# Patient Record
Sex: FEMALE | Race: WHITE
[De-identification: names, ages, dates, MRNs, and addresses within clinical notes are randomized per-mention and may not be internally consistent; named-entity substitution may affect disease eponyms.]

---

## 2017-09-12 ENCOUNTER — HOSPITAL ENCOUNTER (OUTPATIENT)
Dept: HOSPITAL 53 - M LAB | Age: 62
End: 2017-09-12
Attending: FAMILY MEDICINE
Payer: COMMERCIAL

## 2017-09-12 DIAGNOSIS — I10: Primary | ICD-10-CM

## 2017-09-12 LAB
ALBUMIN SERPL BCG-MCNC: 3.8 GM/DL (ref 3.2–5.2)
ALBUMIN/GLOB SERPL: 1.15 {RATIO} (ref 1–1.93)
ALP SERPL-CCNC: 74 U/L (ref 45–117)
ALT SERPL W P-5'-P-CCNC: 43 U/L (ref 12–78)
ANION GAP SERPL CALC-SCNC: 9 MEQ/L (ref 8–16)
AST SERPL-CCNC: 53 U/L (ref 15–37)
BASOPHILS # BLD AUTO: 0 K/MM3 (ref 0–0.2)
BASOPHILS NFR BLD AUTO: 0.3 % (ref 0–1)
BILIRUB SERPL-MCNC: 0.7 MG/DL (ref 0.2–1)
BUN SERPL-MCNC: 15 MG/DL (ref 7–18)
CALCIUM SERPL-MCNC: 9.4 MG/DL (ref 8.8–10.2)
CHLORIDE SERPL-SCNC: 94 MEQ/L (ref 98–107)
CHOLEST SERPL-MCNC: 188 MG/DL (ref ?–200)
CO2 SERPL-SCNC: 29 MEQ/L (ref 21–32)
CREAT SERPL-MCNC: 0.74 MG/DL (ref 0.55–1.02)
EOSINOPHIL # BLD AUTO: 0.2 K/MM3 (ref 0–0.5)
EOSINOPHIL NFR BLD AUTO: 1.8 % (ref 0–3)
ERYTHROCYTE [DISTWIDTH] IN BLOOD BY AUTOMATED COUNT: 12.8 % (ref 11.5–14.5)
GFR SERPL CREATININE-BSD FRML MDRD: > 60 ML/MIN/{1.73_M2} (ref 45–?)
GLUCOSE SERPL-MCNC: 97 MG/DL (ref 80–110)
LARGE UNSTAINED CELL #: 0.1 K/MM3 (ref 0–0.4)
LARGE UNSTAINED CELL %: 1 % (ref 0–4)
LYMPHOCYTES # BLD AUTO: 2.9 K/MM3 (ref 1.5–4.5)
LYMPHOCYTES NFR BLD AUTO: 27.7 % (ref 24–44)
MCH RBC QN AUTO: 31.3 PG (ref 27–33)
MCHC RBC AUTO-ENTMCNC: 34.1 G/DL (ref 32–36.5)
MCV RBC AUTO: 91.8 FL (ref 80–96)
MONOCYTES # BLD AUTO: 0.4 K/MM3 (ref 0–0.8)
MONOCYTES NFR BLD AUTO: 4.4 % (ref 0–5)
NEUTROPHILS # BLD AUTO: 6.5 K/MM3 (ref 1.8–7.7)
NEUTROPHILS NFR BLD AUTO: 64.8 % (ref 36–66)
PLATELET # BLD AUTO: 359 K/MM3 (ref 150–450)
POTASSIUM SERPL-SCNC: 3.8 MEQ/L (ref 3.5–5.1)
PROT SERPL-MCNC: 7.1 GM/DL (ref 6.4–8.2)
SODIUM SERPL-SCNC: 132 MEQ/L (ref 136–145)
TRIGL SERPL-MCNC: 126 MG/DL (ref ?–150)
WBC # BLD AUTO: 10.1 K/MM3 (ref 4–10)

## 2017-12-14 ENCOUNTER — HOSPITAL ENCOUNTER (OUTPATIENT)
Dept: HOSPITAL 53 - M RAD | Age: 62
End: 2017-12-14
Attending: FAMILY MEDICINE
Payer: COMMERCIAL

## 2017-12-14 DIAGNOSIS — M25.532: Primary | ICD-10-CM

## 2017-12-14 NOTE — REP
LEFT WRIST:

 

Four views:

 

There is no evidence of an acute fracture, dislocation or intrinsic bone disease.

I do not see significant arthritic changes.

 

IMPRESSION:

 

No fracture or dislocation. Further evaluation may be made with MRI if clinically

indicated.

 

 

Signed by

Taz Reynolds MD 12/15/2017 04:55 P

## 2018-05-07 ENCOUNTER — HOSPITAL ENCOUNTER (OUTPATIENT)
Dept: HOSPITAL 53 - M WHC | Age: 63
End: 2018-05-07
Attending: FAMILY MEDICINE
Payer: COMMERCIAL

## 2018-05-07 DIAGNOSIS — Z12.31: Primary | ICD-10-CM

## 2018-05-07 PROCEDURE — 77067 SCR MAMMO BI INCL CAD: CPT

## 2018-06-21 ENCOUNTER — HOSPITAL ENCOUNTER (OUTPATIENT)
Dept: HOSPITAL 53 - M LAB | Age: 63
End: 2018-06-21
Attending: FAMILY MEDICINE
Payer: COMMERCIAL

## 2018-06-21 DIAGNOSIS — I10: Primary | ICD-10-CM

## 2018-06-21 LAB
ALBUMIN/GLOBULIN RATIO: 1.22 (ref 1–1.93)
ALBUMIN: 3.9 GM/DL (ref 3.2–5.2)
ALKALINE PHOSPHATASE: 82 U/L (ref 45–117)
ALT SERPL W P-5'-P-CCNC: 28 U/L (ref 12–78)
ANION GAP: 8 MEQ/L (ref 8–16)
AST SERPL-CCNC: 21 U/L (ref 7–37)
BASO #: 0 10^3/UL (ref 0–0.2)
BASO %: 0.3 % (ref 0–1)
BILIRUBIN,TOTAL: 0.5 MG/DL (ref 0.2–1)
BLOOD UREA NITROGEN: 25 MG/DL (ref 7–18)
CALCIUM LEVEL: 9.3 MG/DL (ref 8.8–10.2)
CARBON DIOXIDE LEVEL: 31 MEQ/L (ref 21–32)
CHLORIDE LEVEL: 97 MEQ/L (ref 98–107)
CHOLEST SERPL-MCNC: 183 MG/DL (ref ?–200)
CHOLESTEROL RISK RATIO: 3.45 (ref ?–5)
CREATININE FOR GFR: 0.94 MG/DL (ref 0.55–1.3)
EOS #: 0.1 10^3/UL (ref 0–0.5)
EOSINOPHIL NFR BLD AUTO: 0.4 % (ref 0–3)
GFR SERPL CREATININE-BSD FRML MDRD: > 60 ML/MIN/{1.73_M2} (ref 45–?)
GLUCOSE, FASTING: 93 MG/DL (ref 70–100)
HDLC SERPL-MCNC: 53 MG/DL (ref 40–?)
HEMATOCRIT: 35.4 % (ref 36–47)
HEMOGLOBIN: 12.7 G/DL (ref 12–15.5)
IMMATURE GRANULOCYTE %: 0.2 % (ref 0–3)
LDL CHOLESTEROL: 94 MG/DL (ref ?–100)
LYMPH #: 2.5 10^3/UL (ref 1.5–4.5)
LYMPH %: 17.8 % (ref 24–44)
MEAN CORPUSCULAR HEMOGLOBIN: 31.8 PG (ref 27–33)
MEAN CORPUSCULAR HGB CONC: 35.9 G/DL (ref 32–36.5)
MEAN CORPUSCULAR VOLUME: 88.5 FL (ref 80–96)
MONO #: 0.9 10^3/UL (ref 0–0.8)
MONO %: 6.2 % (ref 0–5)
NEUTROPHILS #: 10.5 10^3/UL (ref 1.8–7.7)
NEUTROPHILS %: 75.1 % (ref 36–66)
NONHDLC SERPL-MCNC: 130 MG/DL
NRBC BLD AUTO-RTO: 0 % (ref 0–0)
PLATELET COUNT, AUTOMATED: 357 10^3/UL (ref 150–450)
POTASSIUM SERUM: 3.6 MEQ/L (ref 3.5–5.1)
RED BLOOD COUNT: 4 10^6/UL (ref 4–5.4)
RED CELL DISTRIBUTION WIDTH: 11.9 % (ref 11.5–14.5)
SODIUM LEVEL: 136 MEQ/L (ref 136–145)
TOTAL PROTEIN: 7.1 GM/DL (ref 6.4–8.2)
TRIGLYCERIDES LEVEL: 180 MG/DL (ref ?–150)
WHITE BLOOD COUNT: 14 10^3/UL (ref 4–10)

## 2018-06-21 PROCEDURE — 80053 COMPREHEN METABOLIC PANEL: CPT

## 2018-06-27 ENCOUNTER — HOSPITAL ENCOUNTER (OUTPATIENT)
Dept: HOSPITAL 53 - M LAB REF | Age: 63
End: 2018-06-27
Attending: INTERNAL MEDICINE
Payer: COMMERCIAL

## 2018-06-27 DIAGNOSIS — R19.7: Primary | ICD-10-CM

## 2018-06-27 PROCEDURE — 82705 FATS/LIPIDS FECES QUAL: CPT

## 2018-06-28 ENCOUNTER — HOSPITAL ENCOUNTER (OUTPATIENT)
Dept: HOSPITAL 53 - M RAD | Age: 63
End: 2018-06-28
Attending: INTERNAL MEDICINE
Payer: COMMERCIAL

## 2018-06-28 DIAGNOSIS — K52.89: Primary | ICD-10-CM

## 2018-06-28 DIAGNOSIS — I70.0: ICD-10-CM

## 2018-07-06 ENCOUNTER — HOSPITAL ENCOUNTER (OUTPATIENT)
Dept: HOSPITAL 53 - M LAB | Age: 63
End: 2018-07-06
Attending: INTERNAL MEDICINE
Payer: COMMERCIAL

## 2018-07-06 DIAGNOSIS — R19.7: Primary | ICD-10-CM

## 2018-07-06 LAB — IMMUNOGLOBULIN A: 277 MG/DL (ref 70–400)

## 2018-07-06 PROCEDURE — 82941 ASSAY OF GASTRIN: CPT

## 2018-07-07 LAB — ELASTASE PANC STL-MCNT: 366 UG/G (ref 200–?)

## 2018-07-10 LAB
CGA SERPL-SCNC: 2 NMOL/L (ref 0–5)
GASTRIN SERPL-MCNC: 63 PG/ML (ref 0–115)

## 2018-07-30 ENCOUNTER — HOSPITAL ENCOUNTER (OUTPATIENT)
Dept: HOSPITAL 53 - M OPP | Age: 63
Discharge: HOME | End: 2018-07-30
Attending: INTERNAL MEDICINE
Payer: COMMERCIAL

## 2018-07-30 DIAGNOSIS — Z79.899: ICD-10-CM

## 2018-07-30 DIAGNOSIS — M54.2: ICD-10-CM

## 2018-07-30 DIAGNOSIS — Q43.8: ICD-10-CM

## 2018-07-30 DIAGNOSIS — K52.9: Primary | ICD-10-CM

## 2018-07-30 DIAGNOSIS — F32.9: ICD-10-CM

## 2018-07-30 DIAGNOSIS — F41.9: ICD-10-CM

## 2018-07-30 DIAGNOSIS — G43.909: ICD-10-CM

## 2018-07-30 DIAGNOSIS — K21.9: ICD-10-CM

## 2018-07-30 DIAGNOSIS — M54.89: ICD-10-CM

## 2018-07-30 DIAGNOSIS — I10: ICD-10-CM

## 2018-07-30 DIAGNOSIS — M19.90: ICD-10-CM

## 2018-07-30 DIAGNOSIS — D12.2: ICD-10-CM

## 2018-07-30 DIAGNOSIS — F17.210: ICD-10-CM

## 2018-07-30 DIAGNOSIS — E78.5: ICD-10-CM

## 2018-07-30 PROCEDURE — 45385 COLONOSCOPY W/LESION REMOVAL: CPT

## 2018-07-30 RX ADMIN — SODIUM CHLORIDE 1 MLS/HR: 9 INJECTION, SOLUTION INTRAVENOUS at 11:45

## 2018-08-03 ENCOUNTER — HOSPITAL ENCOUNTER (OUTPATIENT)
Dept: HOSPITAL 53 - M LAB | Age: 63
End: 2018-08-03
Attending: FAMILY MEDICINE
Payer: COMMERCIAL

## 2018-08-03 DIAGNOSIS — D72.829: Primary | ICD-10-CM

## 2018-08-03 LAB
BASO #: 0 10^3/UL (ref 0–0.2)
BASO %: 0.4 % (ref 0–1)
EOS #: 0.1 10^3/UL (ref 0–0.5)
EOSINOPHIL NFR BLD AUTO: 0.9 % (ref 0–3)
HEMATOCRIT: 35.5 % (ref 36–47)
HEMOGLOBIN: 12.8 G/DL (ref 12–15.5)
IMMATURE GRANULOCYTE %: 0.2 % (ref 0–3)
LYMPH #: 2.7 10^3/UL (ref 1.5–4.5)
LYMPH %: 31.9 % (ref 24–44)
MEAN CORPUSCULAR HEMOGLOBIN: 32.6 PG (ref 27–33)
MEAN CORPUSCULAR HGB CONC: 36.1 G/DL (ref 32–36.5)
MEAN CORPUSCULAR VOLUME: 90.3 FL (ref 80–96)
MONO #: 0.6 10^3/UL (ref 0–0.8)
MONO %: 6.9 % (ref 0–5)
NEUTROPHILS #: 5 10^3/UL (ref 1.8–7.7)
NEUTROPHILS %: 59.7 % (ref 36–66)
NRBC BLD AUTO-RTO: 0 % (ref 0–0)
PLATELET COUNT, AUTOMATED: 351 10^3/UL (ref 150–450)
RED BLOOD COUNT: 3.93 10^6/UL (ref 4–5.4)
RED CELL DISTRIBUTION WIDTH: 12 % (ref 11.5–14.5)
WHITE BLOOD COUNT: 8.4 10^3/UL (ref 4–10)

## 2018-08-03 PROCEDURE — 85025 COMPLETE CBC W/AUTO DIFF WBC: CPT

## 2019-01-04 ENCOUNTER — HOSPITAL ENCOUNTER (OUTPATIENT)
Dept: HOSPITAL 53 - M LAB | Age: 64
End: 2019-01-04
Attending: PHYSICIAN ASSISTANT
Payer: COMMERCIAL

## 2019-01-04 DIAGNOSIS — Z00.00: Primary | ICD-10-CM

## 2019-01-04 DIAGNOSIS — I10: ICD-10-CM

## 2019-01-04 LAB
ALBUMIN SERPL BCG-MCNC: 4 GM/DL (ref 3.2–5.2)
ALT SERPL W P-5'-P-CCNC: 35 U/L (ref 12–78)
BASOPHILS # BLD AUTO: 0.1 10^3/UL (ref 0–0.2)
BASOPHILS NFR BLD AUTO: 0.4 % (ref 0–1)
BILIRUB SERPL-MCNC: 0.5 MG/DL (ref 0.2–1)
BUN SERPL-MCNC: 21 MG/DL (ref 7–18)
CALCIUM SERPL-MCNC: 9 MG/DL (ref 8.8–10.2)
CHLORIDE SERPL-SCNC: 100 MEQ/L (ref 98–107)
CHOLEST SERPL-MCNC: 232 MG/DL (ref ?–200)
CHOLEST/HDLC SERPL: 5.95 {RATIO} (ref ?–5)
CO2 SERPL-SCNC: 28 MEQ/L (ref 21–32)
CREAT SERPL-MCNC: 0.99 MG/DL (ref 0.55–1.3)
EOSINOPHIL # BLD AUTO: 0.1 10^3/UL (ref 0–0.5)
EOSINOPHIL NFR BLD AUTO: 0.9 % (ref 0–3)
GFR SERPL CREATININE-BSD FRML MDRD: > 60 ML/MIN/{1.73_M2} (ref 45–?)
GLUCOSE SERPL-MCNC: 104 MG/DL (ref 70–100)
HCT VFR BLD AUTO: 35.3 % (ref 36–47)
HDLC SERPL-MCNC: 39 MG/DL (ref 40–?)
HGB BLD-MCNC: 12.5 G/DL (ref 12–15.5)
LDLC SERPL CALC-MCNC: 130 MG/DL (ref ?–100)
LYMPHOCYTES # BLD AUTO: 4 10^3/UL (ref 1.5–4.5)
LYMPHOCYTES NFR BLD AUTO: 29.1 % (ref 24–44)
MCH RBC QN AUTO: 32.3 PG (ref 27–33)
MCHC RBC AUTO-ENTMCNC: 35.4 G/DL (ref 32–36.5)
MCV RBC AUTO: 91.2 FL (ref 80–96)
MONOCYTES # BLD AUTO: 0.8 10^3/UL (ref 0–0.8)
MONOCYTES NFR BLD AUTO: 5.5 % (ref 0–5)
NEUTROPHILS # BLD AUTO: 8.9 10^3/UL (ref 1.8–7.7)
NEUTROPHILS NFR BLD AUTO: 63.8 % (ref 36–66)
NONHDLC SERPL-MCNC: 193 MG/DL
PLATELET # BLD AUTO: 430 10^3/UL (ref 150–450)
POTASSIUM SERPL-SCNC: 3.8 MEQ/L (ref 3.5–5.1)
PROT SERPL-MCNC: 7.3 GM/DL (ref 6.4–8.2)
RBC # BLD AUTO: 3.87 10^6/UL (ref 4–5.4)
SODIUM SERPL-SCNC: 135 MEQ/L (ref 136–145)
TRIGL SERPL-MCNC: 317 MG/DL (ref ?–150)
WBC # BLD AUTO: 13.9 10^3/UL (ref 4–10)

## 2019-01-08 ENCOUNTER — HOSPITAL ENCOUNTER (OUTPATIENT)
Dept: HOSPITAL 53 - M LAB | Age: 64
End: 2019-01-08
Attending: FAMILY MEDICINE
Payer: COMMERCIAL

## 2019-01-08 DIAGNOSIS — R30.0: Primary | ICD-10-CM

## 2019-01-09 ENCOUNTER — HOSPITAL ENCOUNTER (OUTPATIENT)
Dept: HOSPITAL 53 - M LAB REF | Age: 64
End: 2019-01-09
Attending: FAMILY MEDICINE
Payer: COMMERCIAL

## 2019-01-09 DIAGNOSIS — R30.0: Primary | ICD-10-CM

## 2019-01-09 LAB
APPEARANCE UR: (no result)
BACTERIA UR QL AUTO: NEGATIVE
BILIRUB UR QL STRIP.AUTO: NEGATIVE
GLUCOSE UR QL STRIP.AUTO: NEGATIVE MG/DL
HGB UR QL STRIP.AUTO: NEGATIVE
KETONES UR QL STRIP.AUTO: NEGATIVE MG/DL
LEUKOCYTE ESTERASE UR QL STRIP.AUTO: NEGATIVE
MUCOUS THREADS URNS QL MICRO: (no result)
NITRITE UR QL STRIP.AUTO: NEGATIVE
PH UR STRIP.AUTO: 5 UNITS (ref 5–9)
PROT UR QL STRIP.AUTO: NEGATIVE MG/DL
RBC # UR AUTO: 4 /HPF (ref 0–3)
SP GR UR STRIP.AUTO: 1.01 (ref 1–1.03)
SQUAMOUS #/AREA URNS AUTO: 0 /HPF (ref 0–6)
UROBILINOGEN UR QL STRIP.AUTO: 0.2 MG/DL (ref 0–2)
WBC #/AREA URNS AUTO: 2 /HPF (ref 0–3)

## 2019-01-28 ENCOUNTER — HOSPITAL ENCOUNTER (OUTPATIENT)
Dept: HOSPITAL 53 - M LAB REF | Age: 64
End: 2019-01-28
Attending: INTERNAL MEDICINE
Payer: COMMERCIAL

## 2019-01-28 DIAGNOSIS — R19.7: Primary | ICD-10-CM

## 2019-04-30 ENCOUNTER — HOSPITAL ENCOUNTER (OUTPATIENT)
Dept: HOSPITAL 53 - M LAB | Age: 64
End: 2019-04-30
Attending: PHYSICIAN ASSISTANT
Payer: COMMERCIAL

## 2019-04-30 DIAGNOSIS — R73.01: Primary | ICD-10-CM

## 2019-04-30 LAB
BASOPHILS # BLD AUTO: 0 10^3/UL (ref 0–0.2)
BASOPHILS NFR BLD AUTO: 0.4 % (ref 0–1)
BUN SERPL-MCNC: 20 MG/DL (ref 7–18)
CALCIUM SERPL-MCNC: 8.6 MG/DL (ref 8.8–10.2)
CHLORIDE SERPL-SCNC: 99 MEQ/L (ref 98–107)
CO2 SERPL-SCNC: 30 MEQ/L (ref 21–32)
CREAT SERPL-MCNC: 1.13 MG/DL (ref 0.55–1.3)
EOSINOPHIL # BLD AUTO: 0.1 10^3/UL (ref 0–0.5)
EOSINOPHIL NFR BLD AUTO: 0.9 % (ref 0–3)
EST. AVERAGE GLUCOSE BLD GHB EST-MCNC: 120 MG/DL (ref 60–110)
GFR SERPL CREATININE-BSD FRML MDRD: 51.9 ML/MIN/{1.73_M2} (ref 45–?)
GLUCOSE SERPL-MCNC: 83 MG/DL (ref 70–100)
HCT VFR BLD AUTO: 33.8 % (ref 36–47)
HGB BLD-MCNC: 11.7 G/DL (ref 12–15.5)
LYMPHOCYTES # BLD AUTO: 3.8 10^3/UL (ref 1.5–4.5)
LYMPHOCYTES NFR BLD AUTO: 41.4 % (ref 24–44)
MCH RBC QN AUTO: 31.7 PG (ref 27–33)
MCHC RBC AUTO-ENTMCNC: 34.6 G/DL (ref 32–36.5)
MCV RBC AUTO: 91.6 FL (ref 80–96)
MONOCYTES # BLD AUTO: 0.6 10^3/UL (ref 0–0.8)
MONOCYTES NFR BLD AUTO: 6.5 % (ref 0–5)
NEUTROPHILS # BLD AUTO: 4.6 10^3/UL (ref 1.8–7.7)
NEUTROPHILS NFR BLD AUTO: 50.6 % (ref 36–66)
PLATELET # BLD AUTO: 384 10^3/UL (ref 150–450)
POTASSIUM SERPL-SCNC: 4.2 MEQ/L (ref 3.5–5.1)
RBC # BLD AUTO: 3.69 10^6/UL (ref 4–5.4)
SODIUM SERPL-SCNC: 134 MEQ/L (ref 136–145)
WBC # BLD AUTO: 9.1 10^3/UL (ref 4–10)

## 2019-07-25 ENCOUNTER — HOSPITAL ENCOUNTER (OUTPATIENT)
Dept: HOSPITAL 53 - M WHC | Age: 64
End: 2019-07-25
Attending: FAMILY MEDICINE
Payer: COMMERCIAL

## 2019-07-25 DIAGNOSIS — Z12.31: Primary | ICD-10-CM

## 2019-07-25 DIAGNOSIS — Z78.0: ICD-10-CM

## 2019-07-25 NOTE — REPMRS
Patient History

The patient states she had a clinical breast exam in 09/2019.

Patient is postmenopausal and is nulliparous.

No known family history of cancer.

No Hormone Replacement Therapy

 

3D TOMOSYNTHESIS WAS PERFORMED.

 

[facility] for [facility] cancer [facility] 6.9%.

 

Digital Woman Screen Mammo: July 25, 2019 - Exam #: 

[id number]

Bilateral CC and MLO view(s) were taken.

 

Technologist: Dorothy Landa, Technologist

Prior study comparison: May 7, 2018, digital woman screen mammo 

performed at [facility].  2016, bilateral 

digital mammo screening bilat, performed at [facility].

 

FINDINGS: The breast tissue is heterogeneously dense.  This may 

lower the sensitivity of mammography.  There has been no change 

in the appearance of the mammogram from the prior studies.  There

is a moderate amount of residual fibroglandular tissue which is 

fairly symmetric. There is no interval development of dominant 

mass, areas of architectural distortion, or clustered 

microcalcification typical of malignancy.

 

Assessment: BI-RADS/ACR category 1 mammogram. Negative Mammogram.

 

Recommendation

Routine screening mammogram in 1 year (for women over age 40).

This mammogram was interpreted with the aid of an FDA-approved 

computer-aided dectection system.

 

Electronically Signed By: Taz Reynolds MD 07/25/19 [id number]

## 2019-10-07 ENCOUNTER — HOSPITAL ENCOUNTER (OUTPATIENT)
Dept: HOSPITAL 53 - M LAB | Age: 64
End: 2019-10-07
Attending: FAMILY MEDICINE
Payer: COMMERCIAL

## 2019-10-07 DIAGNOSIS — M35.00: Primary | ICD-10-CM

## 2020-06-11 ENCOUNTER — HOSPITAL ENCOUNTER (INPATIENT)
Dept: HOSPITAL 53 - M ED | Age: 65
LOS: 5 days | Discharge: HOME | DRG: 720 | End: 2020-06-16
Attending: INTERNAL MEDICINE | Admitting: INTERNAL MEDICINE
Payer: COMMERCIAL

## 2020-06-11 VITALS — DIASTOLIC BLOOD PRESSURE: 57 MMHG | SYSTOLIC BLOOD PRESSURE: 100 MMHG

## 2020-06-11 VITALS — WEIGHT: 137.35 LBS | BODY MASS INDEX: 26.97 KG/M2 | HEIGHT: 60 IN

## 2020-06-11 DIAGNOSIS — N17.9: ICD-10-CM

## 2020-06-11 DIAGNOSIS — N18.9: ICD-10-CM

## 2020-06-11 DIAGNOSIS — D64.9: ICD-10-CM

## 2020-06-11 DIAGNOSIS — Z79.899: ICD-10-CM

## 2020-06-11 DIAGNOSIS — E83.42: ICD-10-CM

## 2020-06-11 DIAGNOSIS — I12.9: ICD-10-CM

## 2020-06-11 DIAGNOSIS — E87.6: ICD-10-CM

## 2020-06-11 DIAGNOSIS — N39.0: ICD-10-CM

## 2020-06-11 DIAGNOSIS — Z88.8: ICD-10-CM

## 2020-06-11 DIAGNOSIS — Z91.14: ICD-10-CM

## 2020-06-11 DIAGNOSIS — A41.9: Primary | ICD-10-CM

## 2020-06-11 DIAGNOSIS — K51.90: ICD-10-CM

## 2020-06-11 LAB
ALBUMIN SERPL BCG-MCNC: 3.2 GM/DL (ref 3.2–5.2)
ALT SERPL W P-5'-P-CCNC: 26 U/L (ref 12–78)
BASOPHILS # BLD AUTO: 0 10^3/UL (ref 0–0.2)
BASOPHILS NFR BLD AUTO: 0.2 % (ref 0–1)
BILIRUB CONJ SERPL-MCNC: < 0.1 MG/DL (ref 0–0.2)
BILIRUB SERPL-MCNC: 0.4 MG/DL (ref 0.2–1)
BUN SERPL-MCNC: 25 MG/DL (ref 7–18)
CALCIUM SERPL-MCNC: 8 MG/DL (ref 8.8–10.2)
CHLORIDE SERPL-SCNC: 111 MEQ/L (ref 98–107)
CK MB CFR.DF SERPL CALC: 1.82
CK MB SERPL-MCNC: < 1 NG/ML (ref ?–3.6)
CK SERPL-CCNC: 55 U/L (ref 26–192)
CO2 SERPL-SCNC: 18 MEQ/L (ref 21–32)
CREAT SERPL-MCNC: 1.47 MG/DL (ref 0.55–1.3)
CRP SERPL-MCNC: 1.18 MG/DL (ref 0–0.3)
EOSINOPHIL # BLD AUTO: 0 10^3/UL (ref 0–0.5)
EOSINOPHIL NFR BLD AUTO: 0.1 % (ref 0–3)
ERYTHROCYTE [SEDIMENTATION RATE] IN BLOOD BY WESTERGREN METHOD: 39 MM/HR (ref 0–30)
GFR SERPL CREATININE-BSD FRML MDRD: 38.2 ML/MIN/{1.73_M2} (ref 45–?)
GLUCOSE SERPL-MCNC: 91 MG/DL (ref 70–100)
HCT VFR BLD AUTO: 32.4 % (ref 36–47)
HGB BLD-MCNC: 11.3 G/DL (ref 12–15.5)
LIPASE SERPL-CCNC: 179 U/L (ref 73–393)
LYMPHOCYTES # BLD AUTO: 1.3 10^3/UL (ref 1.5–5)
LYMPHOCYTES NFR BLD AUTO: 5.5 % (ref 24–44)
MAGNESIUM SERPL-MCNC: 0.6 MG/DL (ref 1.8–2.4)
MCH RBC QN AUTO: 30.4 PG (ref 27–33)
MCHC RBC AUTO-ENTMCNC: 34.9 G/DL (ref 32–36.5)
MCV RBC AUTO: 87.1 FL (ref 80–96)
MONOCYTES # BLD AUTO: 1.3 10^3/UL (ref 0–0.8)
MONOCYTES NFR BLD AUTO: 5.6 % (ref 0–5)
NEUTROPHILS # BLD AUTO: 20.6 10^3/UL (ref 1.5–8.5)
NEUTROPHILS NFR BLD AUTO: 87.8 % (ref 36–66)
NT-PRO BNP: 174 PG/ML (ref ?–125)
PLATELET # BLD AUTO: 308 10^3/UL (ref 150–450)
POTASSIUM SERPL-SCNC: 3.4 MEQ/L (ref 3.5–5.1)
PROT SERPL-MCNC: 6.3 GM/DL (ref 6.4–8.2)
RBC # BLD AUTO: 3.72 10^6/UL (ref 4–5.4)
SODIUM SERPL-SCNC: 140 MEQ/L (ref 136–145)
SODIUM SERPL-SCNC: 143 MEQ/L (ref 136–145)
TROPONIN I SERPL-MCNC: < 0.02 NG/ML (ref ?–0.1)
TSH SERPL DL<=0.005 MIU/L-ACNC: 2.2 UIU/ML (ref 0.36–3.74)
WBC # BLD AUTO: 23.5 10^3/UL (ref 4–10)

## 2020-06-11 RX ADMIN — GABAPENTIN SCH MG: 300 CAPSULE ORAL at 20:48

## 2020-06-11 RX ADMIN — POTASSIUM CHLORIDE AND SODIUM CHLORIDE SCH MLS/HR: 900; 150 INJECTION, SOLUTION INTRAVENOUS at 19:02

## 2020-06-11 RX ADMIN — SODIUM CHLORIDE SCH UNITS: 4.5 INJECTION, SOLUTION INTRAVENOUS at 20:49

## 2020-06-11 RX ADMIN — METHYLPREDNISOLONE SODIUM SUCCINATE SCH MG: 125 INJECTION, POWDER, FOR SOLUTION INTRAMUSCULAR; INTRAVENOUS at 20:48

## 2020-06-11 NOTE — ECGEPIP
[facility] - ED

                                       

                                       Test Date:    2020

Pat Name:     KARTHIK STEWART        Department:   

Patient ID:   [id number]                 Room:         -

Gender:       Female                   Technician:   carlin

:          1955               Requested By: Francy Sanchez 

Order Number: [id number]     Reading MD:   Dhiraj Tinoco

                                 Measurements

Intervals                              Axis          

Rate:         90                       P:            43

NJ:           167                      QRS:          -7

QRSD:         92                       T:            29

QT:           358                                    

QTc:          439                                    

                           Interpretive Statements

SINUS RHYTHM

MINIMAL ST DEPRESSION

NO PRIORS FOR COMPARISON

Electronically Signed on 2020 21:34:32 EDT by Dhiraj Tinoco

## 2020-06-11 NOTE — REP
REASON FOR THIS EXAM:  Chest pain.

 

Latest prior for comparison is 12/29/2010.

 

FINDINGS:  The technique utilized in obtaining the radiograph has magnified the

cardiac silhouette and accentuated the interstitial markings.

 

The superior mediastinal structures are midline.  The cardiac silhouette is

unremarkable in size, shape, and position. The diaphragmatic surfaces of the

lungs are regular, and the costophrenic angles are clear.  The pulmonary fields

are clear.  The imaged osseous structures are intact.

 

IMPRESSION:

 

There is no acute cardiopulmonary disease.

 

 

Electronically Signed by

Simeon Vázquez DO 06/11/2020 05:07 P

## 2020-06-11 NOTE — HPEPDOC
[facility] Medical History & Physical


Date of Admission


Jun 11, 2020


Date of Service:  Jun 11, 2020


Primary Care Physician:  Mary Wallace MD


Attending Physician:  TREVON FRAZIER MD





History and Physical


TIME OF SERVICE: 6:50 PM


   


CHIEF COMPLAINT: Difficulties walking





HISTORY OF PRESENT ILLNESS: 


This is a 64-year-old female who presented with multiple complaints including 

difficulties walking, feeling "numb all over", cold, having spasm in her legs 

and difficulties breathing. The symptoms began around lunchtime while she was at

work. With more pointed questioning she admitted to having a fever, abdominal 

pain, increased thirst, pain with urination, and dark-colored urine. She has had

diarrhea with 5-6 episodes per day for one month because he couldn't afford her 

budesonide. Based on CT scan findings of colon edema Jai Brennan consulted 

 and ordered Zosyn for possible colitis..





REVIEW OF SYSTEMS: 12 point review of systems negative except as listed above





PAST MEDICAL/ SURGICAL HISTORY:


Ulcerative colitis with chronic joint pain


Chronic hypertension


Dyslipidemia


Chronic neck and back pain


Partial Hysterectomy


She denies having a history of MI, CVA or diabetes





SOCIAL HISTORY:


She smokes.


She doesn't drink





FAMILY HISTORY:


She denies having a family history of any medical problems


   


ALLERGIES: Please see below.





HOME MEDICATIONS: Please see below. 





PHYSICAL EXAMINATION:


Vital Signs








  Date Time  Temp Pulse Resp B/P (MAP) Pulse Ox O2 Delivery O2 Flow Rate FiO2


 


6/11/20 13:31    90/52 (65)    


 


6/11/20 13:34 100.6 92 24  100 Room Air  





GEN: slim build/ well developed/ NAD


INTEGUMENT: not flushed/ not jaundice 


HEENT: NCAT 


CVS: RRR/NMRG/ no lower extremity edema


LUNGS:  able to speak full sentences without stopping to take a breath / lungs a

re clear to auscultation bilaterally on room air


ABDOMEN:  Contour (distended) /the abdomen is soft & tender with palpation of 

the epigastric region 


MSK/EXTREMITIES: range of motion intact in all 4 extremities 


NEURO: CN 2-12 are grossly intact / speech is not dysarthric


PSYCH: alert and oriented to person place and time/ able to understand and 

follow all commands





LABORATORY DATA: 





Immature Granulocyte % (Auto) 0.8, Neutrophils (%) (Auto) 87.8H, Lymphocytes (%)

(Auto) 5.5L, Monocytes (%) (Auto) 5.6H, Eosinophils (%) (Auto) 0.1, Basophils 

(%) (Auto) 0.2, Neutrophils # (Auto) 20.6H, Lymphocytes # (Auto) 1.3L, Monocytes

# (Auto) 1.3H, Eosinophils # (Auto) 0.0, Basophils # (Auto) 0.0, Nucleated Red 

Blood Cells % (auto) 0.0, Erythrocyte Sedimentation Rate 39H, Anion Gap 11, 

Glomerular Filtration Rate 38.2L, Calcium Level 8.0L, Total Bilirubin 0.4, 

Direct Bilirubin < 0.1, Aspartate Amino Transf (AST/SGOT) 17, Alanine 

Aminotransferase (ALT/SGPT) 26, Alkaline Phosphatase 95, Total Creatine Kinase 

55, Creatine Kinase MB < 1.0, Creatine Kinase MB Relative Index 1.82, Troponin I

< 0.02, C-Reactive Protein, Quantitative 1.18H, NT-Pro-B-Type Natriuretic 

Peptide 174H, Total Protein 6.3L, Albumin 3.2, Albumin/Globulin Ratio 1.0L, 

Lipase 179, Thyroid Stimulating Hormone (TSH) 2.200


6/11/20 13:50: Lactic Acid Level 3.7*H


6/11/20 17:07: 


Urine Color YELLOW, Urine Appearance HAZY, Urine pH 5.0, Urine Specific Gravity 

1.032, Urine Protein NEGATIVE, Urine Glucose (UA) NEGATIVE, Urine Ketones N

EGATIVE, Urine Blood NEGATIVE, Urine Nitrite POSITIVEH, Urine Bilirubin 

NEGATIVE, Urine Urobilinogen 0.2, Urine Leukocyte Esterase TRACEH, Urine WBC 

(Auto) 10H, Urine RBC (Auto) 3, Urine Hyaline Casts (Auto) 0, Urine Bacteria 

(Auto) 1+H, Urine Squamous Epithelial Cells 1, Urine Mucus (Auto) SMALL, Urine 

Sperm (Auto) 





IMAGING: 


CT abdomen and pelvis "IMPRESSION: CT findings are essentially within normal 

limits with the exception of possible mild mural edema involving the ascending 

and transverse colon without pericolonic inflammatory change.  Since the colon 

is not opacified with oral or bowel preparatory contrast, evaluation of it is 

limited.  The examination is otherwise unremarkable."





MICROBIOLOGY: 


6/11/20 Urine Culture, Received


          Pending


6/11/20 Blood Culture, Received


          Pending


6/11/20 Gastrointestinal Tract Panel (PCR) - Final, Complete


          


6/11/20 Blood Culture, Received


          Pending





ASSESSMENT: 


Ms. Worrell is a 64-year-old with a history of ulcerative colitis,HTN, & 

dyslipidemia who is admitted for management of sepsis, possibly due to UTI, 

acute UC and KARLA.





PLAN:


1. Sepsis possibly 2/2 UTI


SIRS criteria include  HR >90 / WBC >12 /  RR  >20


Her lactic acid and CRP are elevated


QSofa Score = 2points = high risk


Source possibly UTI bc of dirty UA and urinary symptoms


Plan:  admit to PCU / telemetry / Sepsis protocol w repeat lactic /  c/w Zosyn /

IVF /f/u blood cx, UA w Cx / Acetaminophen PRN for fever / target MAP at least 

65 to 70 mmHG / f/u Is and Os with target UOP of at least 0.5 ml/kg/H / target 

serum glucose 140-180 while acutely ill 





2. Acute Ulcerative Colitis


She has had 5-6 episodes of diarrhea daily for 1 month bc she can't afford her 

budesonide


The Metabolic Acidosis is likey 2/2 chronic diarrhea


Plan: start Solumedrol / she has an appointment w  coming up soon / 

will cancel Gen Surgery Consult





3. KARLA vs CKD


KARLA likely prerenal due to diarrhea


Plan: IVF /  f/u ulytes for FENa  or FEUrea / renal US / hold lisinopril





4. Hypokalemia


Likely extrarenal potassium loss ( diarrhea)


Plan: add KCl to IVF / f/u Mg





5.Normocytic anemia - f/u iron panel 


6. Chronic HTN - hold metoprolol, amoldipine and lisinopril bc BP is currently  

low


7. Dyslipidemia -


DVT PROPHYLAXIS: Heparin


DISPOSITION: home after more than 2 midnight's stay





LATE ENTRY


8.Hypokalemia - IV mag sulfate / f/u repeat Mg in AM





Home Medications


Scheduled


Amlodipine Besylate (Amlodipine Besylate) 2.5 Mg Tablet, 2.5 MG PO DAILY


Atorvastatin Calcium (Atorvastatin Calcium) 40 Mg Tab, 40 MG PO DAILY


Calcium Phosphate Trib/Vit D3 (Calcium + Vitamin D3 Gummies) 1 Each Tab.chew, 2 

CHEW PO QHS


Gabapentin (Gabapentin) 300 Mg Cap, 300 MG PO TID


Lisinopril (Lisinopril) 20 Mg Tablet, 20 MG PO DAILY


Metoprolol Succinate (Metoprolol Succinate) 100 Mg Tab.er.24h, 100 MG PO DAILY


Multivitamin (Gummi Bear Multivitamin) 1 Each Tab.chew, 2 CHEW PO QHS


Omeprazole (Omeprazole) 20 Mg Capsule.dr, 20 MG PO DAILY


Sertraline HCl (Sertraline HCl) 100 Mg Tab, 100 MG PO DAILY





Scheduled PRN


Diclofenac Sodium (Diclofenac Sodium) 50 Mg Tablet.dr, 50 MG PO TID PRN for PAIN





Allergies


Coded Allergies:  


     chlorthalidone (Verified  Allergy, Unknown, 6/11/20)





A-FIB/CHADSVASC


A-FIB History


Current/History of A-Fib/PAF?:  No


Current PO Anticoag Therapy:  No











TREVON FRAZIER MD                Jun 11, 2020 18:23

## 2020-06-11 NOTE — REP
REASON FOR EXAM:  Abdominal pain.

 

The latest prior for comparison 09/17/2009.

 

CONTRAST: 100 mL Isovue 370.

 

The lung bases are clear.

 

The liver, gallbladder, spleen, pancreas,  adrenal glands, and kidneys are within

normal limits.  The abdominal aorta and para-aortic regions are within normal

limits.  There is calcific atherosclerotic changes seen involving the abdominal

aorta status quo. The intra-abdominal and intrapelvic bowel loops and their

mesenteries are essentially unchanged from the prior exam.  There is no

pericolonic fatty infiltration. The colon is noncontrast opacified limiting

evaluation of it.  Mild mural edema involving the ascending and transverse colon

cannot be completely ruled out.  There is no free fluid or free air seen in the

abdomen or pelvis.  There is no evidence of an intra-abdominal or intrapelvic

mass or adenopathy.

 

The osseous structures are stable and intact.

 

IMPRESSION:

 

CT findings are essentially within normal limits with the exception of possible

mild mural edema involving the ascending and transverse colon without pericolonic

inflammatory change.  Since the colon is not opacified with oral or bowel

preparatory contrast, evaluation of it is limited.  The examination is otherwise

unremarkable.

 

 

Electronically Signed by

Simeon Vázquez DO 06/11/2020 05:07 P

## 2020-06-12 VITALS — DIASTOLIC BLOOD PRESSURE: 56 MMHG | SYSTOLIC BLOOD PRESSURE: 120 MMHG

## 2020-06-12 VITALS — SYSTOLIC BLOOD PRESSURE: 104 MMHG | DIASTOLIC BLOOD PRESSURE: 57 MMHG

## 2020-06-12 VITALS — DIASTOLIC BLOOD PRESSURE: 64 MMHG | SYSTOLIC BLOOD PRESSURE: 129 MMHG

## 2020-06-12 VITALS — SYSTOLIC BLOOD PRESSURE: 113 MMHG | DIASTOLIC BLOOD PRESSURE: 59 MMHG

## 2020-06-12 VITALS — SYSTOLIC BLOOD PRESSURE: 124 MMHG | DIASTOLIC BLOOD PRESSURE: 66 MMHG

## 2020-06-12 VITALS — DIASTOLIC BLOOD PRESSURE: 58 MMHG | SYSTOLIC BLOOD PRESSURE: 122 MMHG

## 2020-06-12 LAB
BUN SERPL-MCNC: 16 MG/DL (ref 7–18)
CALCIUM SERPL-MCNC: 7.4 MG/DL (ref 8.8–10.2)
CHLORIDE SERPL-SCNC: 116 MEQ/L (ref 98–107)
CO2 SERPL-SCNC: 19 MEQ/L (ref 21–32)
CREAT SERPL-MCNC: 1.01 MG/DL (ref 0.55–1.3)
GFR SERPL CREATININE-BSD FRML MDRD: 58.9 ML/MIN/{1.73_M2} (ref 45–?)
GLUCOSE SERPL-MCNC: 147 MG/DL (ref 70–100)
HCT VFR BLD AUTO: 28.6 % (ref 36–47)
HGB BLD-MCNC: 9.9 G/DL (ref 12–15.5)
MAGNESIUM SERPL-MCNC: 2.2 MG/DL (ref 1.8–2.4)
MCH RBC QN AUTO: 30.7 PG (ref 27–33)
MCHC RBC AUTO-ENTMCNC: 34.6 G/DL (ref 32–36.5)
MCV RBC AUTO: 88.5 FL (ref 80–96)
PLATELET # BLD AUTO: 274 10^3/UL (ref 150–450)
POTASSIUM SERPL-SCNC: 3.8 MEQ/L (ref 3.5–5.1)
RBC # BLD AUTO: 3.23 10^6/UL (ref 4–5.4)
SODIUM SERPL-SCNC: 143 MEQ/L (ref 136–145)
WBC # BLD AUTO: 15.4 10^3/UL (ref 4–10)

## 2020-06-12 RX ADMIN — MAGNESIUM SULFATE IN DEXTROSE SCH MLS/HR: 10 INJECTION, SOLUTION INTRAVENOUS at 04:00

## 2020-06-12 RX ADMIN — GABAPENTIN SCH MG: 300 CAPSULE ORAL at 15:33

## 2020-06-12 RX ADMIN — GABAPENTIN SCH MG: 300 CAPSULE ORAL at 10:30

## 2020-06-12 RX ADMIN — SODIUM CHLORIDE SCH UNITS: 4.5 INJECTION, SOLUTION INTRAVENOUS at 05:40

## 2020-06-12 RX ADMIN — POTASSIUM CHLORIDE AND SODIUM CHLORIDE SCH MLS/HR: 900; 150 INJECTION, SOLUTION INTRAVENOUS at 15:33

## 2020-06-12 RX ADMIN — MAGNESIUM SULFATE IN DEXTROSE SCH MLS/HR: 10 INJECTION, SOLUTION INTRAVENOUS at 01:48

## 2020-06-12 RX ADMIN — GABAPENTIN SCH MG: 300 CAPSULE ORAL at 20:28

## 2020-06-12 RX ADMIN — DEXTROSE MONOHYDRATE SCH MLS/HR: 5 INJECTION INTRAVENOUS at 00:25

## 2020-06-12 RX ADMIN — DEXTROSE MONOHYDRATE SCH MLS/HR: 5 INJECTION INTRAVENOUS at 15:33

## 2020-06-12 RX ADMIN — POTASSIUM CHLORIDE AND SODIUM CHLORIDE SCH MLS/HR: 900; 150 INJECTION, SOLUTION INTRAVENOUS at 05:39

## 2020-06-12 RX ADMIN — DEXTROSE MONOHYDRATE SCH MLS/HR: 5 INJECTION INTRAVENOUS at 23:30

## 2020-06-12 RX ADMIN — MAGNESIUM SULFATE IN DEXTROSE SCH MLS/HR: 10 INJECTION, SOLUTION INTRAVENOUS at 05:49

## 2020-06-12 RX ADMIN — ACETAMINOPHEN PRN MG: 325 TABLET ORAL at 10:06

## 2020-06-12 RX ADMIN — METHYLPREDNISOLONE SODIUM SUCCINATE SCH MG: 125 INJECTION, POWDER, FOR SOLUTION INTRAMUSCULAR; INTRAVENOUS at 11:29

## 2020-06-12 RX ADMIN — SODIUM CHLORIDE SCH UNITS: 4.5 INJECTION, SOLUTION INTRAVENOUS at 14:48

## 2020-06-12 RX ADMIN — SODIUM CHLORIDE SCH UNITS: 4.5 INJECTION, SOLUTION INTRAVENOUS at 20:28

## 2020-06-12 RX ADMIN — OMEPRAZOLE SCH MG: 20 CAPSULE, DELAYED RELEASE ORAL at 10:30

## 2020-06-12 RX ADMIN — MAGNESIUM SULFATE IN DEXTROSE SCH MLS/HR: 10 INJECTION, SOLUTION INTRAVENOUS at 03:10

## 2020-06-12 RX ADMIN — METHYLPREDNISOLONE SODIUM SUCCINATE SCH MG: 125 INJECTION, POWDER, FOR SOLUTION INTRAMUSCULAR; INTRAVENOUS at 05:39

## 2020-06-12 RX ADMIN — MAGNESIUM SULFATE IN DEXTROSE SCH MLS/HR: 10 INJECTION, SOLUTION INTRAVENOUS at 05:00

## 2020-06-12 RX ADMIN — DEXTROSE MONOHYDRATE SCH MLS/HR: 5 INJECTION INTRAVENOUS at 08:38

## 2020-06-12 RX ADMIN — METHYLPREDNISOLONE SODIUM SUCCINATE SCH MG: 125 INJECTION, POWDER, FOR SOLUTION INTRAMUSCULAR; INTRAVENOUS at 20:28

## 2020-06-12 RX ADMIN — SERTRALINE HYDROCHLORIDE SCH MG: 100 TABLET ORAL at 10:30

## 2020-06-12 RX ADMIN — ATORVASTATIN CALCIUM SCH MG: 20 TABLET, FILM COATED ORAL at 10:32

## 2020-06-12 NOTE — IPNPDOC
Text Note


Date of Service


The patient was seen on 6/12/20.





NOTE


Subjective:


-NAD this AM, feels a bit better, however continued to have significant amount 

of diarrhea, 2 episodes thus far today. No abdominal pain





Objective:


GEN: NAD


SKIN: No rashes or lesions


HEENT: NCAT, PERRLA, EOMI, MMM


CVS: RRR, no mrg


LUNGS:  CTAB


ABDOMEN: Normoactive bowel sounds, soft, mild TTP in epigastrium, no rebound or 

guarding


EXTREMITIES: WWP, no edema


NEURO: CN 2-12 are grossly intact, speech is not dysarthric, range of motion 

intact in all 4 extremities 


PSYCH: AOx3





LABORATORY DATA: reviewed


WBC 15.4


hgb 9.9


platelets 274


na 143


K 3.8


Cr 1.01


Mag 2.2


GI panel negative


BCx pending


UCx pending


IMAGING: 


CT abdomen and pelvis "IMPRESSION: CT findings are essentially within normal 

limits with the exception of possible mild mural edema involving the ascending 

and transverse colon without pericolonic inflammatory change.  Since the colon 

is not opacified with oral or bowel preparatory contrast, evaluation of it is 

limited.  The examination is otherwise unremarkable."





ASSESSMENT: 


64-year-old W with UC ,HTN, & dyslipidemia who is admitted for management of 

sepsis, possibly due to UTI, acute UC flare in the setting of inability to 

afford her budesonide and KARLA.





PLAN:


1. Sepsis 2/2 UTI


SIRS criteria include  HR >90 / WBC >12 /  RR  >20


-c/w Zosyn for now, keeping broad also for empiric GI coverage


-continue IVF, may dc this AM if taking good PO and diarrhea slowing down


-f/u blood cx, UCx 


-Acetaminophen PRN 





2. Potential acute Ulcerative Colitis flare in the setting of non-compliance 

with her budesonide due to cost


-5-6 episodes of diarrhea daily for 1 month 


-Had NAGMA likey 2/2 chronic diarrhea


-continue the Solumedrol 125Q8


-has an appointment w  coming up soon in the outpatient setting. 


-Will need to work on helping her with the budesonide cost at discharge with 

patient reporting that she is uninsured since 1/2020 when her job changed their 

insurance provider. PFS consulted.





3. KARLA vs CKD: resolved.


-prerenal due to diarrhea, resolved with hydration


-holding lisinopril, will resume if indicated





4. Hypokalemia


Likely extrarenal potassium loss ( diarrhea), replete





5. Hypomagnesemia:


-Likely same as hypokalemia, repleted, monitor





6.Normocytic anemia - f/u iron panel 





7. Chronic HTN - continue holding metoprolol, amlodipine and lisinopril while BP

is relatively low





DVT PROPHYLAXIS: Heparin


DISPOSITION: home after more than 2 midnight's stay





VS,Serafin, I+O


VS, Serafin, I+O


Laboratory Tests


6/11/20 13:36








6/11/20 19:22








6/12/20 04:38











Vital Signs








  Date Time  Temp Pulse Resp B/P (MAP) Pulse Ox O2 Delivery O2 Flow Rate FiO2


 


6/12/20 08:00 97.3 67 16 104/57 (73) 93 Room Air  














I&O- Last 24 Hours up to 6 AM 


 


 6/12/20





 05:59


 


Intake Total 2110 ml


 


Output Total 300 ml


 


Balance 1810 ml

















DARLEEN VILLAVICENCIO MD   Jun 12, 2020 08:54

## 2020-06-13 VITALS — SYSTOLIC BLOOD PRESSURE: 138 MMHG | DIASTOLIC BLOOD PRESSURE: 74 MMHG

## 2020-06-13 VITALS — DIASTOLIC BLOOD PRESSURE: 73 MMHG | SYSTOLIC BLOOD PRESSURE: 134 MMHG

## 2020-06-13 VITALS — DIASTOLIC BLOOD PRESSURE: 80 MMHG | SYSTOLIC BLOOD PRESSURE: 169 MMHG

## 2020-06-13 VITALS — SYSTOLIC BLOOD PRESSURE: 136 MMHG | DIASTOLIC BLOOD PRESSURE: 66 MMHG

## 2020-06-13 VITALS — SYSTOLIC BLOOD PRESSURE: 160 MMHG | DIASTOLIC BLOOD PRESSURE: 80 MMHG

## 2020-06-13 LAB
BUN SERPL-MCNC: 11 MG/DL (ref 7–18)
CALCIUM SERPL-MCNC: 7.7 MG/DL (ref 8.8–10.2)
CHLORIDE SERPL-SCNC: 116 MEQ/L (ref 98–107)
CO2 SERPL-SCNC: 23 MEQ/L (ref 21–32)
CREAT SERPL-MCNC: 0.66 MG/DL (ref 0.55–1.3)
GFR SERPL CREATININE-BSD FRML MDRD: > 60 ML/MIN/{1.73_M2} (ref 45–?)
GLUCOSE SERPL-MCNC: 150 MG/DL (ref 70–100)
HCT VFR BLD AUTO: 28 % (ref 36–47)
HGB BLD-MCNC: 9.3 G/DL (ref 12–15.5)
MCH RBC QN AUTO: 30.2 PG (ref 27–33)
MCHC RBC AUTO-ENTMCNC: 33.2 G/DL (ref 32–36.5)
MCV RBC AUTO: 90.9 FL (ref 80–96)
PLATELET # BLD AUTO: 270 10^3/UL (ref 150–450)
POTASSIUM SERPL-SCNC: 3.9 MEQ/L (ref 3.5–5.1)
RBC # BLD AUTO: 3.08 10^6/UL (ref 4–5.4)
SODIUM SERPL-SCNC: 142 MEQ/L (ref 136–145)
WBC # BLD AUTO: 16.1 10^3/UL (ref 4–10)

## 2020-06-13 RX ADMIN — SODIUM CHLORIDE SCH UNITS: 4.5 INJECTION, SOLUTION INTRAVENOUS at 05:33

## 2020-06-13 RX ADMIN — METHYLPREDNISOLONE SODIUM SUCCINATE SCH MG: 125 INJECTION, POWDER, FOR SOLUTION INTRAMUSCULAR; INTRAVENOUS at 05:32

## 2020-06-13 RX ADMIN — GABAPENTIN SCH MG: 300 CAPSULE ORAL at 21:09

## 2020-06-13 RX ADMIN — CEPHALEXIN SCH MG: 500 CAPSULE ORAL at 08:36

## 2020-06-13 RX ADMIN — POTASSIUM CHLORIDE AND SODIUM CHLORIDE SCH MLS/HR: 900; 150 INJECTION, SOLUTION INTRAVENOUS at 02:40

## 2020-06-13 RX ADMIN — SODIUM CHLORIDE SCH UNITS: 4.5 INJECTION, SOLUTION INTRAVENOUS at 21:10

## 2020-06-13 RX ADMIN — BUDESONIDE SCH MG: 3 CAPSULE ORAL at 08:36

## 2020-06-13 RX ADMIN — SERTRALINE HYDROCHLORIDE SCH MG: 100 TABLET ORAL at 08:36

## 2020-06-13 RX ADMIN — METOPROLOL SUCCINATE SCH MG: 100 TABLET, EXTENDED RELEASE ORAL at 14:03

## 2020-06-13 RX ADMIN — ATORVASTATIN CALCIUM SCH MG: 20 TABLET, FILM COATED ORAL at 08:36

## 2020-06-13 RX ADMIN — OMEPRAZOLE SCH MG: 20 CAPSULE, DELAYED RELEASE ORAL at 08:36

## 2020-06-13 RX ADMIN — GABAPENTIN SCH MG: 300 CAPSULE ORAL at 16:58

## 2020-06-13 RX ADMIN — SODIUM CHLORIDE SCH UNITS: 4.5 INJECTION, SOLUTION INTRAVENOUS at 14:01

## 2020-06-13 RX ADMIN — GABAPENTIN SCH MG: 300 CAPSULE ORAL at 08:35

## 2020-06-13 RX ADMIN — CEPHALEXIN SCH MG: 500 CAPSULE ORAL at 14:02

## 2020-06-13 RX ADMIN — CEPHALEXIN SCH MG: 500 CAPSULE ORAL at 21:09

## 2020-06-13 NOTE — IPNPDOC
Text Note


Date of Service


The patient was seen on 6/13/20.





NOTE


Subjective:


-NAD this AM, continues to feel better


-Much improved diarrhea, no abdominal pain





Objective:


GEN: NAD


SKIN: No rashes or lesions


HEENT: NCAT, PERRLA, EOMI, MMM


CVS: RRR, no mrg


LUNGS:  CTAB


ABDOMEN: Normoactive bowel sounds, soft, mild TTP in epigastrium, no rebound or 

guarding


EXTREMITIES: WWP, no edema


NEURO: CN 2-12 are grossly intact, speech is not dysarthric, range of motion 

intact in all 4 extremities 


PSYCH: AOx3





LABORATORY DATA: reviewed. AM labs pending


GI panel negative


BCx NGTD


UCx growing klebsiella





IMAGING: 


CT abdomen and pelvis "IMPRESSION: CT findings are essentially within normal 

limits with the exception of possible mild mural edema involving the ascending 

and transverse colon without pericolonic inflammatory change.  Since the colon 

is not opacified with oral or bowel preparatory contrast, evaluation of it is 

limited.  The examination is otherwise unremarkable."





ASSESSMENT: 


64-year-old W with UC ,HTN, & dyslipidemia who is admitted for management of 

sepsis 2/2 Klebsiella UTI, acute UC flare in the setting of inability to afford 

her budesonide and KARLA.





PLAN:


1. Sepsis 2/2 UTI


SIRS criteria include  HR >90 / WBC >12 /  RR  >20


-discontinue Zosyn and switch to keflex 500 Q6H for klebs UTI


-discontinue IVF with resolution of diarrhea


-f/u blood cx NGTD


-Acetaminophen PRN 





2. Potential acute Ulcerative Colitis flare in the setting of non-compliance 

with her budesonide due to cost


-5-6 episodes of diarrhea daily for 1 month, only 2 charted yesterday.


-Had SHAW acosta 2/2 chronic diarrhea


-switch from Solumedrol 125Q8 to 9mg PO budesonide daily


-has an appointment w  coming up soon in the outpatient setting. 


-Will need to work on helping her with the budesonide cost at discharge with 

patient reporting that she is uninsured since 1/2020 when her job changed their 

insurance provider. PFS consulted.





3. KARLA vs CKD: resolved.


-prerenal due to diarrhea, resolved with hydration


-holding lisinopril, will resume if indicated





4. Hypokalemia


Likely extrarenal potassium loss ( diarrhea), replete





5. Hypomagnesemia:


-Likely same as hypokalemia, repleted, monitor





6.Normocytic anemia - f/u iron panel 





7. Chronic HTN - continue holding metoprolol, amlodipine and lisinopril while BP

is relatively low





DVT PROPHYLAXIS: Heparin


DISPOSITION: home after observing continued clinical improvement on now PO abx 

and PO steroids. Likely home in the next 1-2 days





VS,Fishbone, I+O


VS, Fishbone, I+O





Vital Signs








  Date Time  Temp Pulse Resp B/P (MAP) Pulse Ox O2 Delivery O2 Flow Rate FiO2


 


6/13/20 04:00 97.1 56 16 136/66 (89) 95 Room Air  














I&O- Last 24 Hours up to 6 AM 


 


 6/13/20





 06:00


 


Intake Total 840 ml


 


Output Total 1250 ml


 


Balance -410 ml

















DARLEEN VILLAVICENCIO MD   Jun 13, 2020 07:21

## 2020-06-14 VITALS — SYSTOLIC BLOOD PRESSURE: 155 MMHG | DIASTOLIC BLOOD PRESSURE: 74 MMHG

## 2020-06-14 VITALS — DIASTOLIC BLOOD PRESSURE: 64 MMHG | SYSTOLIC BLOOD PRESSURE: 121 MMHG

## 2020-06-14 VITALS — SYSTOLIC BLOOD PRESSURE: 144 MMHG | DIASTOLIC BLOOD PRESSURE: 72 MMHG

## 2020-06-14 VITALS — DIASTOLIC BLOOD PRESSURE: 74 MMHG | SYSTOLIC BLOOD PRESSURE: 154 MMHG

## 2020-06-14 VITALS — SYSTOLIC BLOOD PRESSURE: 145 MMHG | DIASTOLIC BLOOD PRESSURE: 71 MMHG

## 2020-06-14 VITALS — DIASTOLIC BLOOD PRESSURE: 71 MMHG | SYSTOLIC BLOOD PRESSURE: 142 MMHG

## 2020-06-14 LAB
BUN SERPL-MCNC: 10 MG/DL (ref 7–18)
CALCIUM SERPL-MCNC: 8.1 MG/DL (ref 8.8–10.2)
CHLORIDE SERPL-SCNC: 115 MEQ/L (ref 98–107)
CO2 SERPL-SCNC: 24 MEQ/L (ref 21–32)
CREAT SERPL-MCNC: 0.63 MG/DL (ref 0.55–1.3)
GFR SERPL CREATININE-BSD FRML MDRD: > 60 ML/MIN/{1.73_M2} (ref 45–?)
GLUCOSE SERPL-MCNC: 83 MG/DL (ref 70–100)
HCT VFR BLD AUTO: 29.3 % (ref 36–47)
HGB BLD-MCNC: 10.1 G/DL (ref 12–15.5)
MAGNESIUM SERPL-MCNC: 1.8 MG/DL (ref 1.8–2.4)
MCH RBC QN AUTO: 30.9 PG (ref 27–33)
MCHC RBC AUTO-ENTMCNC: 34.5 G/DL (ref 32–36.5)
MCV RBC AUTO: 89.6 FL (ref 80–96)
PLATELET # BLD AUTO: 300 10^3/UL (ref 150–450)
POTASSIUM SERPL-SCNC: 3.5 MEQ/L (ref 3.5–5.1)
RBC # BLD AUTO: 3.27 10^6/UL (ref 4–5.4)
SODIUM SERPL-SCNC: 146 MEQ/L (ref 136–145)
WBC # BLD AUTO: 18.7 10^3/UL (ref 4–10)

## 2020-06-14 RX ADMIN — BUDESONIDE SCH MG: 3 CAPSULE ORAL at 09:52

## 2020-06-14 RX ADMIN — ATORVASTATIN CALCIUM SCH MG: 20 TABLET, FILM COATED ORAL at 09:54

## 2020-06-14 RX ADMIN — SODIUM CHLORIDE SCH UNITS: 4.5 INJECTION, SOLUTION INTRAVENOUS at 22:11

## 2020-06-14 RX ADMIN — CIPROFLOXACIN HYDROCHLORIDE SCH MG: 500 TABLET, FILM COATED ORAL at 09:54

## 2020-06-14 RX ADMIN — GABAPENTIN SCH MG: 300 CAPSULE ORAL at 17:04

## 2020-06-14 RX ADMIN — CEPHALEXIN SCH MG: 500 CAPSULE ORAL at 02:59

## 2020-06-14 RX ADMIN — ACETAMINOPHEN PRN MG: 325 TABLET ORAL at 17:12

## 2020-06-14 RX ADMIN — METOPROLOL SUCCINATE SCH MG: 100 TABLET, EXTENDED RELEASE ORAL at 09:54

## 2020-06-14 RX ADMIN — SODIUM CHLORIDE SCH UNITS: 4.5 INJECTION, SOLUTION INTRAVENOUS at 13:46

## 2020-06-14 RX ADMIN — OMEPRAZOLE SCH MG: 20 CAPSULE, DELAYED RELEASE ORAL at 09:54

## 2020-06-14 RX ADMIN — SODIUM CHLORIDE SCH UNITS: 4.5 INJECTION, SOLUTION INTRAVENOUS at 06:03

## 2020-06-14 RX ADMIN — SERTRALINE HYDROCHLORIDE SCH MG: 100 TABLET ORAL at 09:54

## 2020-06-14 RX ADMIN — GABAPENTIN SCH MG: 300 CAPSULE ORAL at 20:31

## 2020-06-14 RX ADMIN — GABAPENTIN SCH MG: 300 CAPSULE ORAL at 09:54

## 2020-06-14 RX ADMIN — ACETAMINOPHEN PRN MG: 325 TABLET ORAL at 06:23

## 2020-06-14 RX ADMIN — METRONIDAZOLE SCH MG: 500 TABLET ORAL at 20:31

## 2020-06-14 RX ADMIN — CIPROFLOXACIN HYDROCHLORIDE SCH MG: 500 TABLET, FILM COATED ORAL at 17:03

## 2020-06-14 NOTE — IPNPDOC
Text Note


Date of Service


The patient was seen on 6/14/20.





NOTE


Subjective:


-Low grade fever to 100.1 this morning, otherwise feels ok


-3BMs yesterday





Objective:


GEN: NAD


SKIN: No rashes or lesions


HEENT: NCAT, PERRLA, EOMI, MMM


CVS: RRR, no mrg


LUNGS:  CTAB


ABDOMEN: Normoactive bowel sounds, soft, mild TTP in epigastrium, no rebound or 

guarding


EXTREMITIES: WWP, no edema


NEURO: CN 2-12 are grossly intact, speech is not dysarthric, range of motion 

intact in all 4 extremities 


PSYCH: AOx3





LABORATORY DATA: reviewed. WBC 18.7, hgb 10.1, platelets 300, K 3.5, mag 1.8, Cr

0.63


GI panel negative


BCx NGTD


UCx growing klebsiella





IMAGING: 


CT abdomen and pelvis "IMPRESSION: CT findings are essentially within normal 

limits with the exception of possible mild mural edema involving the ascending 

and transverse colon without pericolonic inflammatory change.  Since the colon 

is not opacified with oral or bowel preparatory contrast, evaluation of it is 

limited.  The examination is otherwise unremarkable."





ASSESSMENT: 


64-year-old W with UC ,HTN, & dyslipidemia who is admitted for management of 

sepsis 2/2 Klebsiella UTI, acute UC flare in the setting of inability to afford 

her budesonide and KARLA.





PLAN:


1. Sepsis 2/2 UTI


SIRS criteria include  HR >90 / WBC >12 /  RR  >20


-discontinue keflex 500 Q6H for klebs UTI, switch to cipro to also add GI 

coverage


-f/u blood cx NGTD


-Acetaminophen PRN 





2. Potential acute Ulcerative Colitis flare in the setting of non-compliance 

with her budesonide due to cost


-5-6 episodes of diarrhea daily for 1 month, only 2 charted yesterday.


-Had NAGMA likey 2/2 chronic diarrhea


-continue 9mg PO budesonide daily


-continue cipro that covers both Klebs UTI and empiric GI coverage


-has an appointment w  coming up soon in the outpatient setting. 


-Will need to work on helping her with the budesonide cost at discharge with 

patient reporting that she is uninsured since 1/2020 when her job changed their 

insurance provider. PFS consulted.





3. KARLA vs CKD: resolved.


-prerenal due to diarrhea, resolved with hydration


-holding lisinopril, will resume if indicated





4. Hypokalemia


Likely extrarenal potassium loss ( diarrhea), replete with 40meq this AM





5. Hypomagnesemia:


-Likely same as hypokalemia, replete with 1 g IV this AM





6.Normocytic anemia - f/u iron panel 





7. Chronic HTN - continue holding metoprolol, amlodipine and lisinopril while BP

is relatively low





DVT PROPHYLAXIS: Heparin


DISPOSITION: home after observing continued clinical improvement now PO abx and 

PO steroids and secured her budesonide script and a way to afford it until she 

is seen by GI as an outpatient. Likely home in the next 1-2 days





 VS, Fishbone, I+O 





VS,Fishbone, I+O


VS, Fishbone, I+O


Laboratory Tests


6/14/20 04:46











Vital Signs








  Date Time  Temp Pulse Resp B/P (MAP) Pulse Ox O2 Delivery O2 Flow Rate FiO2


 


6/14/20 06:18 100.1 78 20 154/74 (100) 93 Room Air  














I&O- Last 24 Hours up to 6 AM 


 


 6/14/20





 06:00


 


Intake Total 2190 ml


 


Output Total 1500 ml


 


Balance 690 ml

















DARLEEN VILLAVICENCIO MD   Jun 14, 2020 09:22

## 2020-06-15 VITALS — DIASTOLIC BLOOD PRESSURE: 63 MMHG | SYSTOLIC BLOOD PRESSURE: 129 MMHG

## 2020-06-15 VITALS — DIASTOLIC BLOOD PRESSURE: 79 MMHG | SYSTOLIC BLOOD PRESSURE: 157 MMHG

## 2020-06-15 VITALS — DIASTOLIC BLOOD PRESSURE: 70 MMHG | SYSTOLIC BLOOD PRESSURE: 130 MMHG

## 2020-06-15 VITALS — SYSTOLIC BLOOD PRESSURE: 148 MMHG | DIASTOLIC BLOOD PRESSURE: 72 MMHG

## 2020-06-15 VITALS — SYSTOLIC BLOOD PRESSURE: 147 MMHG | DIASTOLIC BLOOD PRESSURE: 74 MMHG

## 2020-06-15 LAB
BUN SERPL-MCNC: 7 MG/DL (ref 7–18)
CALCIUM SERPL-MCNC: 7.8 MG/DL (ref 8.8–10.2)
CHLORIDE SERPL-SCNC: 108 MEQ/L (ref 98–107)
CO2 SERPL-SCNC: 27 MEQ/L (ref 21–32)
CREAT SERPL-MCNC: 0.59 MG/DL (ref 0.55–1.3)
GFR SERPL CREATININE-BSD FRML MDRD: > 60 ML/MIN/{1.73_M2} (ref 45–?)
GLUCOSE SERPL-MCNC: 93 MG/DL (ref 70–100)
HCT VFR BLD AUTO: 27.7 % (ref 36–47)
HGB BLD-MCNC: 9.7 G/DL (ref 12–15.5)
MAGNESIUM SERPL-MCNC: 1.6 MG/DL (ref 1.8–2.4)
MCH RBC QN AUTO: 31.1 PG (ref 27–33)
MCHC RBC AUTO-ENTMCNC: 35 G/DL (ref 32–36.5)
MCV RBC AUTO: 88.8 FL (ref 80–96)
PLATELET # BLD AUTO: 282 10^3/UL (ref 150–450)
POTASSIUM SERPL-SCNC: 3.6 MEQ/L (ref 3.5–5.1)
RBC # BLD AUTO: 3.12 10^6/UL (ref 4–5.4)
SODIUM SERPL-SCNC: 143 MEQ/L (ref 136–145)
WBC # BLD AUTO: 12.1 10^3/UL (ref 4–10)

## 2020-06-15 RX ADMIN — OMEPRAZOLE SCH MG: 20 CAPSULE, DELAYED RELEASE ORAL at 08:17

## 2020-06-15 RX ADMIN — SODIUM CHLORIDE SCH UNITS: 4.5 INJECTION, SOLUTION INTRAVENOUS at 05:49

## 2020-06-15 RX ADMIN — SODIUM CHLORIDE SCH UNITS: 4.5 INJECTION, SOLUTION INTRAVENOUS at 16:49

## 2020-06-15 RX ADMIN — CIPROFLOXACIN HYDROCHLORIDE SCH MG: 500 TABLET, FILM COATED ORAL at 16:50

## 2020-06-15 RX ADMIN — BUDESONIDE SCH MG: 3 CAPSULE ORAL at 08:17

## 2020-06-15 RX ADMIN — ACETAMINOPHEN PRN MG: 325 TABLET ORAL at 06:37

## 2020-06-15 RX ADMIN — ATORVASTATIN CALCIUM SCH MG: 20 TABLET, FILM COATED ORAL at 08:17

## 2020-06-15 RX ADMIN — GABAPENTIN SCH MG: 300 CAPSULE ORAL at 21:06

## 2020-06-15 RX ADMIN — METOPROLOL SUCCINATE SCH MG: 100 TABLET, EXTENDED RELEASE ORAL at 08:18

## 2020-06-15 RX ADMIN — GABAPENTIN SCH MG: 300 CAPSULE ORAL at 08:18

## 2020-06-15 RX ADMIN — SERTRALINE HYDROCHLORIDE SCH MG: 100 TABLET ORAL at 08:18

## 2020-06-15 RX ADMIN — METRONIDAZOLE SCH MG: 500 TABLET ORAL at 08:17

## 2020-06-15 RX ADMIN — GABAPENTIN SCH MG: 300 CAPSULE ORAL at 16:50

## 2020-06-15 RX ADMIN — ACETAMINOPHEN PRN MG: 325 TABLET ORAL at 21:07

## 2020-06-15 RX ADMIN — METRONIDAZOLE SCH MG: 500 TABLET ORAL at 16:50

## 2020-06-15 RX ADMIN — CIPROFLOXACIN HYDROCHLORIDE SCH MG: 500 TABLET, FILM COATED ORAL at 05:49

## 2020-06-15 RX ADMIN — SODIUM CHLORIDE SCH UNITS: 4.5 INJECTION, SOLUTION INTRAVENOUS at 21:07

## 2020-06-15 NOTE — IPNPDOC
Text Note


Date of Service


The patient was seen on 6/15/20.





NOTE


Subjective:


-Low grade fever this morning


-Otherwise no complaints





Objective:


GEN: NAD


SKIN: No rashes or lesions


HEENT: NCAT, PERRLA, EOMI, MMM


CVS: RRR, no mrg


LUNGS:  CTAB


ABDOMEN: Normoactive bowel sounds, soft, NTND, no rebound or guarding


EXTREMITIES: WWP, no edema


NEURO: CN 2-12 are grossly intact, speech is not dysarthric, range of motion 

intact in all 4 extremities 


PSYCH: AOx3





LABORATORY DATA: reviewed. WBC 12.1, hgb 9.7, platelets 297, K 3.6, mag 1.6 

(repleted), Cr 0.64


GI panel negative


BCx NGTD


UCx growing klebsiella





IMAGING: 


CT abdomen and pelvis "IMPRESSION: CT findings are essentially within normal 

limits with the exception of possible mild mural edema involving the ascending 

and transverse colon without pericolonic inflammatory change.  Since the colon 

is not opacified with oral or bowel preparatory contrast, evaluation of it is 

limited.  The examination is otherwise unremarkable."





ASSESSMENT: 


64-year-old W with UC ,HTN, & dyslipidemia who is admitted for management of 

sepsis 2/2 Klebsiella UTI, acute UC flare in the setting of inability to afford 

her budesonide and KARLA with course c/b fevers.





PLAN:


1. Sepsis 2/2 UTI


SIRS criteria include  HR >90 / WBC >12 /  RR  >20


-2/2 Klebs UTI on cipro to also add GI coverage. have treated for 5d at this 

point.  Can stop tomorrow if afebrile.


-f/u blood cx NGTD


-Acetaminophen PRN 





2. Potential acute Ulcerative Colitis flare in the setting of non-compliance 

with her budesonide due to cost


-5-6 episodes of diarrhea daily for 1 month, now well controlled


-Had NAGMA likey 2/2 chronic diarrhea


-continue 9mg PO budesonide daily with taper per GI recs


-continue cipro that covers both Klebs UTI and empiric GI coverage today, 

discontinue tomorrow if clinically stable without fevers. With plan for 

budesonide taper by 3mg every 4 weeks per GI recs.


-has an appointment w  coming up soon in the outpatient setting. 


-PFS working on helping with insurance coverage vs. affording her steroids.


-dc flagyl


-consulted GI, appreciate recs





3. KARLA vs CKD: resolved.


-prerenal due to diarrhea, resolved with hydration


-holding lisinopril, will resume if indicated





4. Hypokalemia


2/2 diarrhea, repleted





5. Hypomagnesemia:


-Likely same as hypokalemia, replete with 1 g IV this AM





6.Normocytic anemia - f/u iron panel 





7. Chronic HTN - continue holding metoprolol, amlodipine and lisinopril while BP

is relatively low





DVT PROPHYLAXIS: Heparin


DISPOSITION: home after observing continued clinical improvement on PO steroids 

and secured her budesonide script and a way to afford it until she is seen by GI

as an outpatient. Likely home in the next 24-48h





VS,Serafin, I+O


VS, Serafin, I+O


Laboratory Tests


6/15/20 05:06











Vital Signs








  Date Time  Temp Pulse Resp B/P (MAP) Pulse Ox O2 Delivery O2 Flow Rate FiO2


 


6/15/20 14:00 98.4 59 19 130/70 (90) 97 Room Air  














I&O- Last 24 Hours up to 6 AM 


 


 6/15/20





 06:00


 


Intake Total 2000 ml


 


Output Total 1700 ml


 


Balance 300 ml

















DARLEEN VILLAVICENCIO MD   Sigifredo 15, 2020 19:08

## 2020-06-15 NOTE — CR.PDOC
General


Date of Consultation:  Sigifredo 15, 2020


Referring Provider:  PAM RAYA MD


Attending Physician:  JESE NERI MD





Consultation


Primary physician/ hospitalist: -Dr. Pam Raya


Reason for consult:  - Evaluation of diarrhea and low grade fever.





HPI: 


64-year-old female patient with HTN, HLD, chronic neck and back pain, 

microscopic collagenous colitis (diagnosed on colonoscopy biopsy in 2016, was 

treated with multiple courses of oral budesonide, following with Dr. Ham) was

admitted to [facility] for UTI and sepsis. Patient also haivng diarrhea with upto 5-6 

loose stooler per day ( as she was not taking budesonide). Patient reports while

taking budesonide her diarrhea was well controlled. Patient was treated with 

antibiotics for UTI and resume Don budesonide while in hospital. Patient 

continues to have low-grade fever despite improvement in her bowel habits and 

urinary symptoms. GI was consulted for further evaluation of colitis.





Pertinent negative GI symptoms:


Patient denies, sick contacts, recent travel, loss of appetite, early satiety or

unintentional weight loss. No history of hematemesis, melena or hematochezia. 





Review of Systems:


GI: as stated above 


CVS: No chest pain, No palpitations, No leg swelling.


RS: No Shortness of breath, No Wheezing, no cough


CNS: No dizziness, No motor weakness, No sensory problems 


Hematology: No bruising, No gum bleeding, 


Musculoskeletal: No joint pain, ambulating well.


Skin: No rash 


: No hematuria, No burning sensation of the urine 


ENT:  No ear discharge/ pain, No dysphagia.


Eyes: No photophobia. Jaundice





Home medications: reviewed. Antithrombotic agents:  -None


Medical h/o: As above.


Surgical h/o: None on abdomen. 


Social h/o: Alcohol:  -. Denies, smoking: Active smoker, IVDA/ drugs: Denies. 


Family h/o of GI cancers - None


Prior Endoscopies: 


Last EGD and colonoscopy in July 2018 by Dr. Ham -- normal EGD, sessile 

polyps in ascending colon, tortuous sigmoid colon, rest of the colon - Normal. 

Random colon biopsies normal in pathology, few tubular adenomas, no celiac 

disease on small bowel biopsy.





Prior GI evaluations: -Follows with Dr. Ham in [facility].





Exam: 


Vitals: reviewed 


General: Alert and oriented x 3, not in distress


HEENT: NO pallor, no icterus. Normal oropharynx,  NO cervical lymph nodes.


Chest: symmetric with bilateral clear air entry,


CVS: S1, S2 heard, normal, no murmurs .


Abdomen:  non-distended, no surgical scars, soft, non-tender, no palpable 

masses, normal bowel sounds heard.


Rectal exam: Patient refused / Deferred at this time in view of scheduled colono

scopy.


Extremities: no pedal edema, pulses palpable.


CNS: no focal motor or sensory deficits. Moves all extremities


Skin: no rash.





Labs: reviewed.


Imaging: reviewed. 





Impression:





- Chronic diarrhea from microsocpic colitis and not taking medication due to 

insurance issues. Currently symptoms improved on resumption of oral budesonide. 


- Low grade fever with UTI and sepsis. - on antibiotics.





Recommendations:





- Patient educated about the test results, possible differential diagnoses and 

All questions answered.


- Continue tapering course of budesonide-- 9mg for 4 weeks and then 6 mg and 

then 3 mg for long term maintenance course. 


- Follow up the repeat septic work up. 


- At this time as the diarrhea is improving and stool panel negative, unlikely 

the cause for patient fevers. Consider discontinuing the metronidazole and 

patient would not need antibiotics for GI source.


- Patient is educated to avoid NSAIDs ( including diclofenac) as they can worsen

Microscopic colitis.


- Adjust antibiotics for the UTI as per the primary team and culture/ 

sensitivities.


- Patient to follow up with [facility] for routine follow up for long term 

management of microscopic colitis.





Plan of care discussed with patient and primary team. Patient verbalized 

understanding and agreed with the plan.





Vital Signs/I&O





Vital Signs








  Date Time  Temp Pulse Resp B/P (MAP) Pulse Ox O2 Delivery O2 Flow Rate FiO2


 


6/15/20 14:00 98.4 59 19 130/70 (90) 97 Room Air  














I&O- Last 24 Hours up to 6 AM 


 


 6/15/20





 06:00


 


Intake Total 2000 ml


 


Output Total 1700 ml


 


Balance 300 ml











Laboratory Data


Labs 24H


Laboratory Tests 2


6/15/20 05:06: 


Nucleated Red Blood Cells % (auto) 0.0, Anion Gap 8, Glomerular Filtration Rate 

> 60.0, Calcium Level 7.8L, Magnesium Level 1.6L


CBC/BMP


Laboratory Tests


6/15/20 05:06








Microbiology





Microbiology


6/14/20 Blood Culture, Received


          Pending


6/14/20 Blood Culture, Received


          Pending


6/11/20 Urine Culture - Final, Complete


          Klebsiella Pneumoniae


6/11/20 Blood Culture - Preliminary, Resulted


          No Growth after 72 hours. All specime...


6/11/20 Gastrointestinal Tract Panel (PCR) - Final, Complete


          


6/11/20 Blood Culture - Preliminary, Resulted


          No Growth after 72 hours. All specime...





Allergies


Coded Allergies:  


     chlorthalidone (Verified  Allergy, Unknown, 6/11/20)





Home Medications


Scheduled


Amlodipine Besylate (Amlodipine Besylate) 2.5 Mg Tablet, 2.5 MG PO DAILY, 

(Reported)


Atorvastatin Calcium (Atorvastatin Calcium) 40 Mg Tab, 40 MG PO DAILY, 

(Reported)


Calcium Phosphate Trib/Vit D3 (Calcium + Vitamin D3 Gummies) 1 Each Tab.chew, 2 

CHEW PO QHS, (Reported)


Gabapentin (Gabapentin) 300 Mg Cap, 300 MG PO TID, (Reported)


Lisinopril (Lisinopril) 20 Mg Tablet, 20 MG PO DAILY, (Reported)


Metoprolol Succinate (Metoprolol Succinate) 100 Mg Tab.er.24h, 100 MG PO DAILY, 

(Reported)


Multivitamin (Gummi Bear Multivitamin) 1 Each Tab.chew, 2 CHEW PO QHS, 

(Reported)


Omeprazole (Omeprazole) 20 Mg Capsule.dr, 20 MG PO DAILY, (Reported)


Sertraline HCl (Sertraline HCl) 100 Mg Tab, 100 MG PO DAILY, (Reported)





Scheduled PRN


Diclofenac Sodium (Diclofenac Sodium) 50 Mg Tablet.dr, 50 MG PO TID PRN for 

PAIN, (Reported)











JESE NERI MD      Sigifredo 15, 2020 18:25

## 2020-06-16 VITALS — SYSTOLIC BLOOD PRESSURE: 122 MMHG | DIASTOLIC BLOOD PRESSURE: 69 MMHG

## 2020-06-16 VITALS — SYSTOLIC BLOOD PRESSURE: 158 MMHG | DIASTOLIC BLOOD PRESSURE: 81 MMHG

## 2020-06-16 VITALS — DIASTOLIC BLOOD PRESSURE: 82 MMHG | SYSTOLIC BLOOD PRESSURE: 159 MMHG

## 2020-06-16 VITALS — SYSTOLIC BLOOD PRESSURE: 136 MMHG | DIASTOLIC BLOOD PRESSURE: 75 MMHG

## 2020-06-16 LAB
BUN SERPL-MCNC: 8 MG/DL (ref 7–18)
CALCIUM SERPL-MCNC: 8.2 MG/DL (ref 8.8–10.2)
CHLORIDE SERPL-SCNC: 107 MEQ/L (ref 98–107)
CO2 SERPL-SCNC: 30 MEQ/L (ref 21–32)
CREAT SERPL-MCNC: 0.62 MG/DL (ref 0.55–1.3)
GFR SERPL CREATININE-BSD FRML MDRD: > 60 ML/MIN/{1.73_M2} (ref 45–?)
GLUCOSE SERPL-MCNC: 97 MG/DL (ref 70–100)
HCT VFR BLD AUTO: 28.8 % (ref 36–47)
HGB BLD-MCNC: 9.9 G/DL (ref 12–15.5)
MAGNESIUM SERPL-MCNC: 1.7 MG/DL (ref 1.8–2.4)
MCH RBC QN AUTO: 30.3 PG (ref 27–33)
MCHC RBC AUTO-ENTMCNC: 34.4 G/DL (ref 32–36.5)
MCV RBC AUTO: 88.1 FL (ref 80–96)
PLATELET # BLD AUTO: 306 10^3/UL (ref 150–450)
POTASSIUM SERPL-SCNC: 3.3 MEQ/L (ref 3.5–5.1)
RBC # BLD AUTO: 3.27 10^6/UL (ref 4–5.4)
SODIUM SERPL-SCNC: 143 MEQ/L (ref 136–145)
WBC # BLD AUTO: 10.2 10^3/UL (ref 4–10)

## 2020-06-16 RX ADMIN — ACETAMINOPHEN PRN MG: 325 TABLET ORAL at 08:14

## 2020-06-16 RX ADMIN — GABAPENTIN SCH MG: 300 CAPSULE ORAL at 08:13

## 2020-06-16 RX ADMIN — ATORVASTATIN CALCIUM SCH MG: 20 TABLET, FILM COATED ORAL at 08:13

## 2020-06-16 RX ADMIN — OMEPRAZOLE SCH MG: 20 CAPSULE, DELAYED RELEASE ORAL at 08:14

## 2020-06-16 RX ADMIN — METOPROLOL SUCCINATE SCH MG: 100 TABLET, EXTENDED RELEASE ORAL at 08:13

## 2020-06-16 RX ADMIN — BUDESONIDE SCH MG: 3 CAPSULE ORAL at 08:13

## 2020-06-16 RX ADMIN — CIPROFLOXACIN HYDROCHLORIDE SCH MG: 500 TABLET, FILM COATED ORAL at 05:16

## 2020-06-16 RX ADMIN — SERTRALINE HYDROCHLORIDE SCH MG: 100 TABLET ORAL at 08:13

## 2020-06-16 RX ADMIN — SODIUM CHLORIDE SCH UNITS: 4.5 INJECTION, SOLUTION INTRAVENOUS at 05:16

## 2020-06-16 RX ADMIN — SODIUM CHLORIDE SCH UNITS: 4.5 INJECTION, SOLUTION INTRAVENOUS at 14:00

## 2020-06-16 NOTE — DS.PDOC
Discharge Summary


General


Date of Admission


Jun 11, 2020 at 18:14


Date of Discharge


6/16/20





Discharge Summary


PROCEDURES PERFORMED DURING STAY: [None].





ADMITTING DIAGNOSES: 


Sepsis 2/2 UTI


acute Ulcerative Colitis 


NAGMA


KARLA


Hypokalemia


Hypomagnesemia


Normocytic anemia 


Chronic HTN





DISCHARGE DIAGNOSES:





Sepsis 2/2 UTI


acute Ulcerative Colitis 


NAGMA


KARLA


Hypokalemia


Hypomagnesemia


Normocytic anemia 


Chronic HTN





COMPLICATIONS/CHIEF COMPLAINT: Sepsis.





HISTORY OF PRESENT ILLNESS: This is a 64-year-old female who presented with 

multiple complaints including difficulties walking, feeling "numb all over", 

cold, having spasm in her legs and difficulties breathing. The symptoms began 

around lunchtime while she was at work. With more pointed questioning she 

admitted to having a fever, abdominal pain, increased thirst, pain with 

urination, and dark-colored urine. She has had diarrhea with 5-6 episodes per 

day for one month because he couldn't afford her budesonide. Based on CT scan 

findings of colon edema Jai Brennan consulted  and ordered Zosyn for

 possible colitis..








HOSPITAL COURSE: 





During hospital stay following issue addressed


1. Sepsis 2/2 UTI


Resolved after treatment with ciprofloxacin





2. Potential acute Ulcerative Colitis flare in the setting of non-compliance 

with her budesonide due to cost


-5-6 episodes of diarrhea daily for 1 month, now well controlled


-Had NAGMA likey 2/2 chronic diarrhea


-continue 9mg PO budesonide daily with taper per GI recs


-continue cipro that covers both Klebs UTI and empiric GI coverage today, 

discontinue tomorrow if clinically stable without fevers. With plan for 

budesonide taper by 3mg every 4 weeks per GI recs.


-has an appointment w  coming up soon in the outpatient setting. 


-PFS working on helping with insurance coverage vs. affording her steroids.


-dc flagyl


-consulted GI, appreciate recs





3. KARLA vs CKD: resolved.


-prerenal due to diarrhea, resolved with hydration


-holding lisinopril, will resume if indicated





4. Hypokalemia


2/2 diarrhea, repleted





5. Hypomagnesemia:


-Likely same as hypokalemia, replete with 1 g IV this AM





6.Normocytic anemia - f/u iron panel 





7. Chronic HTN - continue holding metoprolol, amlodipine and lisinopril while BP

 is relatively low


DISCHARGE MEDICATIONS: Please see below.


 


ALLERGIES: Please see below.





PHYSICAL EXAMINATION ON DISCHARGE:


VITAL SIGNS: Please see below.


GEN: NAD


SKIN: No rashes or lesions


HEENT: NCAT, PERRLA, EOMI, MMM


CVS: RRR, no mrg


LUNGS:  CTAB


ABDOMEN: Normoactive bowel sounds, soft, NTND, no rebound or guarding


EXTREMITIES: WWP, no edema


NEURO: CN 2-12 are grossly intact, speech is not dysarthric, range of motion 

intact in all 4 extremities 


PSYCH: AOx3








LABORATORY DATA: Please see below.





IMAGING: REASON FOR EXAM:  Abdominal pain.


 


The latest prior for comparison 09/17/2009.


 


CONTRAST: 100 mL Isovue 370.


 


The lung bases are clear.


 


The liver, gallbladder, spleen, pancreas,  adrenal glands, and kidneys are 

within


normal limits.  The abdominal aorta and para-aortic regions are within normal


limits.  There is calcific atherosclerotic changes seen involving the abdominal


aorta status quo. The intra-abdominal and intrapelvic bowel loops and their


mesenteries are essentially unchanged from the prior exam.  There is no


pericolonic fatty infiltration. The colon is noncontrast opacified limiting


evaluation of it.  Mild mural edema involving the ascending and transverse colon


cannot be completely ruled out.  There is no free fluid or free air seen in the


abdomen or pelvis.  There is no evidence of an intra-abdominal or intrapelvic


mass or adenopathy.


 


The osseous structures are stable and intact.


 


IMPRESSION:


 


CT findings are essentially within normal limits with the exception of possible


mild mural edema involving the ascending and transverse colon without 

pericolonic


inflammatory change.  Since the colon is not opacified with oral or bowel


preparatory contrast, evaluation of it is limited.  The examination is otherwise


unremarkable.





PROGNOSIS: Fair





ACTIVITY: [As tolerated].





DIET: Cardiac








DISPOSITION: 01 Home, Self-Care.





DISCHARGE INSTRUCTIONS:





Patient is educated to avoid NSAIDs ( including diclofenac) as they can worsen 

Microscopic colitis


F/u with GI and PCP in 3-5 days





ITEMS TO FOLLOWUP ON ON OUTPATIENT:


Follow-up with PCP and GI





DISCHARGE CONDITION: [Stable].





TIME SPENT ON DISCHARGE: Greater than 20 minutes.





Vital Signs/I&Os





Vital Signs








  Date Time  Temp Pulse Resp B/P (MAP) Pulse Ox O2 Delivery O2 Flow Rate FiO2


 


6/16/20 10:00 98.4 55 18 122/69 (86) 96 Room Air  














I&O- Last 24 Hours up to 6 AM 


 


 6/16/20





 06:00


 


Intake Total 960 ml


 


Output Total 650 ml


 


Balance 310 ml











Laboratory Data


Labs 24H


Laboratory Tests 2


6/16/20 05:57: 


Nucleated Red Blood Cells % (auto) 0.0, Anion Gap 6L, Glomerular Filtration Rate

 > 60.0, Calcium Level 8.2L, Magnesium Level 1.7L


CBC/BMP


Laboratory Tests


6/16/20 05:57











Microbiology





Microbiology


6/16/20 Urine Culture, Received


          Pending


6/14/20 Blood Culture - Preliminary, Resulted


          No growth after 24 hours . All specim...


6/14/20 Blood Culture - Preliminary, Resulted


          No growth after 24 hours . All specim...


6/11/20 Urine Culture - Final, Complete


          Klebsiella Pneumoniae


6/11/20 Blood Culture - Final, Complete


          NO GROWTH AFTER 5 DAYS


6/11/20 Gastrointestinal Tract Panel (PCR) - Final, Complete


          


6/11/20 Blood Culture - Final, Complete


          NO GROWTH AFTER 5 DAYS





Discharge Medications


Scheduled


Amlodipine Besylate (Amlodipine Besylate) 2.5 Mg Tablet, 2.5 MG PO DAILY, 

(Reported)


Atorvastatin Calcium (Atorvastatin Calcium) 40 Mg Tab, 40 MG PO DAILY, 

(Reported)


Budesonide (Budesonide EC) 3 Mg Capdr...er, 9 MG PO QAM


   Continue tapering course of budesonide-- 9mg for 4 weeks and then 6 mg and 

then 3 mg for long term maintenance course. 


Calcium Phosphate Trib/Vit D3 (Calcium + Vitamin D3 Gummies) 1 Each Tab.chew, 2 

CHEW PO QHS, (Reported)


Ciprofloxacin HCl (Cipro) 500 Mg Tablet, 500 MG PO DAILY


Gabapentin (Gabapentin) 300 Mg Cap, 300 MG PO TID, (Reported)


Lisinopril (Lisinopril) 20 Mg Tablet, 20 MG PO DAILY, (Reported)


Metoprolol Succinate (Metoprolol Succinate) 100 Mg Tab.er.24h, 100 MG PO DAILY, 

(Reported)


Multivitamin (Gummi Bear Multivitamin) 1 Each Tab.chew, 2 CHEW PO QHS, 

(Reported)


Omeprazole (Omeprazole) 20 Mg Capsule.dr, 20 MG PO DAILY, (Reported)


Sertraline HCl (Sertraline HCl) 100 Mg Tab, 100 MG PO DAILY, (Reported)





Allergies


Coded Allergies:  


     chlorthalidone (Verified  Allergy, Unknown, 6/11/20)











RICCARDO MARX DO            Jun 16, 2020 18:02

## 2021-01-11 ENCOUNTER — HOSPITAL ENCOUNTER (OUTPATIENT)
Dept: HOSPITAL 53 - M WHC | Age: 66
End: 2021-01-11
Attending: PHYSICIAN ASSISTANT
Payer: COMMERCIAL

## 2021-01-11 DIAGNOSIS — Z12.31: Primary | ICD-10-CM

## 2021-01-11 NOTE — REPMRS
Patient History

The patient states she has not had a clinical breast exam in over

a year.

No known family history of cancer.

No Hormone Replacement Therapy

 

3D TOMOSYNTHESIS WAS PERFORMED.

 

[facility]%.

 

Vol[place] breast density b.

 

Digital Woman Screen Mammo: January 11, 2021 - Exam #: 

[id number]

Bilateral CC and MLO view(s) were taken.

 

Technologist: Beverley Ram, Technologist

Prior study comparison: July 25, 2019, bilateral digital woman 

screen mammo performed at [facility] 

[facility].  May 7, 2018, digital woman screen mammo performed 

at [facility].

 

FINDINGS: The breast tissue is heterogeneously dense.  This may 

lower the sensitivity of mammography.  There has been no change 

in the appearance of the mammogram from the prior studies.  There

is a moderate amount of residual fibroglandular tissue which is 

fairly symmetric. There is no interval development of dominant 

mass, areas of architectural distortion, or clustered 

microcalcification typical of malignancy.

 

Assessment: BI-RADS/ACR category 1 mammogram. Negative Mammogram.

 

Recommendation

Routine screening mammogram in 1 year (for women over age 40).

This mammogram was interpreted with the aid of an FDA-approved 

computer-aided dectection system.

 

Electronically Signed By: Taz Reynolds MD 01/11/21 2095

## 2021-02-06 ENCOUNTER — HOSPITAL ENCOUNTER (OUTPATIENT)
Dept: HOSPITAL 53 - M LABSMTC | Age: 66
End: 2021-02-06
Attending: PEDIATRICS
Payer: SELF-PAY

## 2021-02-06 DIAGNOSIS — Z20.822: Primary | ICD-10-CM

## 2021-02-17 ENCOUNTER — HOSPITAL ENCOUNTER (OUTPATIENT)
Dept: HOSPITAL 53 - M LAB | Age: 66
End: 2021-02-17
Attending: FAMILY MEDICINE
Payer: COMMERCIAL

## 2021-02-17 DIAGNOSIS — I10: Primary | ICD-10-CM

## 2021-02-17 LAB
ALBUMIN SERPL BCG-MCNC: 3.7 GM/DL (ref 3.2–5.2)
ALT SERPL W P-5'-P-CCNC: 20 U/L (ref 12–78)
BASOPHILS # BLD AUTO: 0.1 10^3/UL (ref 0–0.2)
BASOPHILS NFR BLD AUTO: 0.5 % (ref 0–1)
BILIRUB SERPL-MCNC: 0.5 MG/DL (ref 0.2–1)
BUN SERPL-MCNC: 15 MG/DL (ref 7–18)
CALCIUM SERPL-MCNC: 9.5 MG/DL (ref 8.8–10.2)
CHLORIDE SERPL-SCNC: 103 MEQ/L (ref 98–107)
CHOLEST SERPL-MCNC: 236 MG/DL (ref ?–200)
CHOLEST/HDLC SERPL: 4.92 {RATIO} (ref ?–5)
CO2 SERPL-SCNC: 30 MEQ/L (ref 21–32)
CREAT SERPL-MCNC: 0.96 MG/DL (ref 0.55–1.3)
EOSINOPHIL # BLD AUTO: 0.1 10^3/UL (ref 0–0.5)
EOSINOPHIL NFR BLD AUTO: 0.5 % (ref 0–3)
GFR SERPL CREATININE-BSD FRML MDRD: > 60 ML/MIN/{1.73_M2} (ref 45–?)
GLUCOSE SERPL-MCNC: 77 MG/DL (ref 70–100)
HCT VFR BLD AUTO: 36.2 % (ref 36–47)
HDLC SERPL-MCNC: 48 MG/DL (ref 40–?)
HGB BLD-MCNC: 12.1 G/DL (ref 12–15.5)
LDLC SERPL CALC-MCNC: 142 MG/DL (ref ?–100)
LYMPHOCYTES # BLD AUTO: 3.5 10^3/UL (ref 1.5–5)
LYMPHOCYTES NFR BLD AUTO: 34.1 % (ref 24–44)
MCH RBC QN AUTO: 30.6 PG (ref 27–33)
MCHC RBC AUTO-ENTMCNC: 33.4 G/DL (ref 32–36.5)
MCV RBC AUTO: 91.4 FL (ref 80–96)
MONOCYTES # BLD AUTO: 0.7 10^3/UL (ref 0–0.8)
MONOCYTES NFR BLD AUTO: 7.1 % (ref 2–8)
NEUTROPHILS # BLD AUTO: 5.9 10^3/UL (ref 1.5–8.5)
NEUTROPHILS NFR BLD AUTO: 57.6 % (ref 36–66)
NONHDLC SERPL-MCNC: 188 MG/DL
PLATELET # BLD AUTO: 359 10^3/UL (ref 150–450)
POTASSIUM SERPL-SCNC: 4.2 MEQ/L (ref 3.5–5.1)
PROT SERPL-MCNC: 7.2 GM/DL (ref 6.4–8.2)
RBC # BLD AUTO: 3.96 10^6/UL (ref 4–5.4)
SODIUM SERPL-SCNC: 139 MEQ/L (ref 136–145)
TRIGL SERPL-MCNC: 230 MG/DL (ref ?–150)
WBC # BLD AUTO: 10.2 10^3/UL (ref 4–10)

## 2021-03-02 ENCOUNTER — HOSPITAL ENCOUNTER (OUTPATIENT)
Dept: HOSPITAL 53 - M LAB | Age: 66
End: 2021-03-02
Attending: INTERNAL MEDICINE
Payer: COMMERCIAL

## 2021-03-02 DIAGNOSIS — D64.9: Primary | ICD-10-CM

## 2021-03-02 LAB
ALBUMIN SERPL BCG-MCNC: 3.8 GM/DL (ref 3.2–5.2)
ALT SERPL W P-5'-P-CCNC: 24 U/L (ref 12–78)
BASOPHILS # BLD AUTO: 0.1 10^3/UL (ref 0–0.2)
BASOPHILS NFR BLD AUTO: 0.6 % (ref 0–1)
BILIRUB SERPL-MCNC: 0.1 MG/DL (ref 0.2–1)
BUN SERPL-MCNC: 15 MG/DL (ref 7–18)
CALCIUM SERPL-MCNC: 8.9 MG/DL (ref 8.8–10.2)
CHLORIDE SERPL-SCNC: 106 MEQ/L (ref 98–107)
CO2 SERPL-SCNC: 43 MEQ/L (ref 21–32)
CREAT SERPL-MCNC: 0.83 MG/DL (ref 0.55–1.3)
EOSINOPHIL # BLD AUTO: 0.1 10^3/UL (ref 0–0.5)
EOSINOPHIL NFR BLD AUTO: 0.7 % (ref 0–3)
FOLATE SERPL-MCNC: 11.2 NG/ML
GFR SERPL CREATININE-BSD FRML MDRD: > 60 ML/MIN/{1.73_M2} (ref 45–?)
GLUCOSE SERPL-MCNC: 92 MG/DL (ref 70–100)
HCT VFR BLD AUTO: 36.4 % (ref 36–47)
HGB BLD-MCNC: 12.3 G/DL (ref 12–15.5)
IRON SATN MFR SERPL: 15.4 % (ref 13.2–45)
IRON SERPL-MCNC: 47 UG/DL (ref 50–170)
LYMPHOCYTES # BLD AUTO: 5.1 10^3/UL (ref 1.5–5)
LYMPHOCYTES NFR BLD AUTO: 43.1 % (ref 24–44)
MCH RBC QN AUTO: 31.1 PG (ref 27–33)
MCHC RBC AUTO-ENTMCNC: 33.8 G/DL (ref 32–36.5)
MCV RBC AUTO: 91.9 FL (ref 80–96)
MONOCYTES # BLD AUTO: 0.9 10^3/UL (ref 0–0.8)
MONOCYTES NFR BLD AUTO: 7.2 % (ref 2–8)
NEUTROPHILS # BLD AUTO: 5.7 10^3/UL (ref 1.5–8.5)
NEUTROPHILS NFR BLD AUTO: 47.9 % (ref 36–66)
PLATELET # BLD AUTO: 357 10^3/UL (ref 150–450)
POTASSIUM SERPL-SCNC: 3.9 MEQ/L (ref 3.5–5.1)
PROT SERPL-MCNC: 6.9 GM/DL (ref 6.4–8.2)
RBC # BLD AUTO: 3.96 10^6/UL (ref 4–5.4)
SODIUM SERPL-SCNC: 139 MEQ/L (ref 136–145)
TIBC SERPL-MCNC: 305 UG/DL (ref 250–450)
VIT B12 SERPL-MCNC: 506 PG/ML
WBC # BLD AUTO: 11.9 10^3/UL (ref 4–10)

## 2021-03-15 ENCOUNTER — HOSPITAL ENCOUNTER (OUTPATIENT)
Dept: HOSPITAL 53 - M RAD | Age: 66
End: 2021-03-15
Attending: PAIN MEDICINE
Payer: COMMERCIAL

## 2021-03-15 DIAGNOSIS — M54.12: Primary | ICD-10-CM

## 2021-03-15 NOTE — REPVR
PROCEDURE INFORMATION: 

Exam: MR Cervical Spine Without Contrast 

Exam date and time: 3/15/2021 3:36 PM 

Age: 65 years old 

Clinical indication: Radiculopathy; Cervical region 



TECHNIQUE: 

Imaging protocol: Multiplanar magnetic resonance images of the cervical spine 

without contrast. 



COMPARISON: 

No relevant prior studies available. 



FINDINGS: 

Vertebrae: No acute fracture is identified. Alignment is anatomic. 

Spinal cord: The cervical cord is of normal signal intensity and size. 

C2-C3: There is moderate left facet arthropathy. This is causing minimal left 

neural foraminal narrowing. There is no spinal canal or right foraminal 

stenosis. 

C3-C4: There is moderate left uncinate spurring, mild left facet arthropathy, 

and moderate right facet arthropathy. There is no spinal canal stenosis. 

C4-C5: There is a tiny central disc protrusion and moderate facet arthropathy. 

There is no significant spinal canal or neural foraminal stenosis. 

C5-C6: There is a shallow broad-based posterior disc osteophyte complex, mild 

uncinate spurring, and mild facet arthropathy. There is no significant spinal 

canal or neural foraminal stenosis. 

C6-C7: Mild facet arthropathy is present. There is no significant spinal canal 

or neural foraminal stenosis. 

C7-T1: There is moderate left facet arthropathy. There is no significant spinal 

canal or neural foraminal stenosis. 

Soft tissues: The prevertebral soft tissues are within normal limits. 



Vertebral arteries: Expected flow voids in the vertebral arteries. 



IMPRESSION: 

Mild degenerative changes of the cervical spine as discussed above



Electronically signed by: Antwan Luciano On 03/15/2021  17:23:03 PM

## 2021-04-02 ENCOUNTER — HOSPITAL ENCOUNTER (OUTPATIENT)
Dept: HOSPITAL 53 - M EKG | Age: 66
End: 2021-04-02
Attending: NURSE PRACTITIONER
Payer: COMMERCIAL

## 2021-04-02 DIAGNOSIS — I10: Primary | ICD-10-CM

## 2021-04-02 NOTE — ECGEPIP
[facility]

                                       

                                       Test Date:    2021

Pat Name:     KARTHIK STEWART        Department:   

Patient ID:   [id number]                 Room:         -

Gender:       Female                   Technician:   ROBE

:          1955               Requested By: Lorraine Parra [facility]

Order Number: [id number]     Reading MD:   Jai Oglesby

                                 Measurements

Intervals                              Axis          

Rate:         60                       P:            59

MI:           184                      QRS:          -5

QRSD:         84                       T:            39

QT:           452                                    

QTc:          452                                    

                           Interpretive Statements

Normal sinus rhythm

Electronically Signed on 2021 17:22:52 EDT by Jai Oglesby

## 2021-10-09 ENCOUNTER — HOSPITAL ENCOUNTER (OUTPATIENT)
Dept: HOSPITAL 53 - M LAB REF | Age: 66
End: 2021-10-09
Attending: PHYSICIAN ASSISTANT
Payer: COMMERCIAL

## 2021-10-09 DIAGNOSIS — N39.0: Primary | ICD-10-CM

## 2021-10-09 LAB
APPEARANCE UR: CLEAR
BACTERIA UR QL AUTO: NEGATIVE
BILIRUB UR QL STRIP.AUTO: NEGATIVE
GLUCOSE UR QL STRIP.AUTO: NEGATIVE MG/DL
HGB UR QL STRIP.AUTO: NEGATIVE
KETONES UR QL STRIP.AUTO: NEGATIVE MG/DL
LEUKOCYTE ESTERASE UR QL STRIP.AUTO: NEGATIVE
NITRITE UR QL STRIP.AUTO: NEGATIVE
PH UR STRIP.AUTO: 6 UNITS (ref 5–9)
PROT UR QL STRIP.AUTO: NEGATIVE MG/DL
RBC # UR AUTO: 0 /HPF (ref 0–3)
SP GR UR STRIP.AUTO: 1 (ref 1–1.03)
SQUAMOUS #/AREA URNS AUTO: 0 /HPF (ref 0–6)
UROBILINOGEN UR QL STRIP.AUTO: 0.2 MG/DL (ref 0–2)
WBC #/AREA URNS AUTO: 0 /HPF (ref 0–3)

## 2022-02-12 ENCOUNTER — HOSPITAL ENCOUNTER (EMERGENCY)
Dept: HOSPITAL 53 - M ED | Age: 67
Discharge: HOME | End: 2022-02-12
Payer: COMMERCIAL

## 2022-02-12 VITALS — DIASTOLIC BLOOD PRESSURE: 76 MMHG | SYSTOLIC BLOOD PRESSURE: 161 MMHG

## 2022-02-12 DIAGNOSIS — I10: ICD-10-CM

## 2022-02-12 DIAGNOSIS — S39.012A: Primary | ICD-10-CM

## 2022-02-12 DIAGNOSIS — M62.830: ICD-10-CM

## 2022-02-12 DIAGNOSIS — I70.8: ICD-10-CM

## 2022-02-12 DIAGNOSIS — I70.0: ICD-10-CM
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
## 2022-02-12 DIAGNOSIS — M19.90: ICD-10-CM

## 2022-02-12 DIAGNOSIS — M41.86: ICD-10-CM
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
## 2022-02-12 DIAGNOSIS — Z79.899: ICD-10-CM
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
## 2022-02-12 DIAGNOSIS — M43.16: ICD-10-CM

## 2022-02-12 DIAGNOSIS — M51.27: ICD-10-CM

## 2022-02-12 LAB
APPEARANCE UR: CLEAR
BACTERIA UR QL AUTO: NEGATIVE
BASOPHILS # BLD AUTO: 0 10^3/UL (ref 0–0.2)
BASOPHILS NFR BLD AUTO: 0.4 % (ref 0–1)
BILIRUB UR QL STRIP.AUTO: NEGATIVE
BUN SERPL-MCNC: 12 MG/DL (ref 7–18)
CALCIUM SERPL-MCNC: 8.7 MG/DL (ref 8.8–10.2)
CHLORIDE SERPL-SCNC: 107 MEQ/L (ref 98–107)
CO2 SERPL-SCNC: 25 MEQ/L (ref 21–32)
CREAT SERPL-MCNC: 1.2 MG/DL (ref 0.55–1.3)
EOSINOPHIL # BLD AUTO: 0.1 10^3/UL (ref 0–0.5)
EOSINOPHIL NFR BLD AUTO: 0.5 % (ref 0–3)
GFR SERPL CREATININE-BSD FRML MDRD: 48 ML/MIN/{1.73_M2} (ref 45–?)
GLUCOSE SERPL-MCNC: 127 MG/DL (ref 70–100)
GLUCOSE UR QL STRIP.AUTO: NEGATIVE MG/DL
HCT VFR BLD AUTO: 33 % (ref 36–47)
HGB BLD-MCNC: 10.8 G/DL (ref 12–15.5)
HGB UR QL STRIP.AUTO: NEGATIVE
KETONES UR QL STRIP.AUTO: NEGATIVE MG/DL
LEUKOCYTE ESTERASE UR QL STRIP.AUTO: NEGATIVE
LYMPHOCYTES # BLD AUTO: 2.1 10^3/UL (ref 1.5–5)
LYMPHOCYTES NFR BLD AUTO: 18.4 % (ref 24–44)
MCH RBC QN AUTO: 30.5 PG (ref 27–33)
MCHC RBC AUTO-ENTMCNC: 32.7 G/DL (ref 32–36.5)
MCV RBC AUTO: 93.2 FL (ref 80–96)
MONOCYTES # BLD AUTO: 0.6 10^3/UL (ref 0–0.8)
MONOCYTES NFR BLD AUTO: 5.7 % (ref 2–8)
MUCOUS THREADS URNS QL MICRO: (no result)
NEUTROPHILS # BLD AUTO: 8.4 10^3/UL (ref 1.5–8.5)
NEUTROPHILS NFR BLD AUTO: 74.6 % (ref 36–66)
NITRITE UR QL STRIP.AUTO: NEGATIVE
PH UR STRIP.AUTO: 5 UNITS (ref 5–9)
PLATELET # BLD AUTO: 344 10^3/UL (ref 150–450)
POTASSIUM SERPL-SCNC: 3.9 MEQ/L (ref 3.5–5.1)
PROT UR QL STRIP.AUTO: NEGATIVE MG/DL
RBC # BLD AUTO: 3.54 10^6/UL (ref 4–5.4)
RBC # UR AUTO: 1 /HPF (ref 0–3)
SODIUM SERPL-SCNC: 140 MEQ/L (ref 136–145)
SP GR UR STRIP.AUTO: 1.01 (ref 1–1.03)
SQUAMOUS #/AREA URNS AUTO: 1 /HPF (ref 0–6)
UROBILINOGEN UR QL STRIP.AUTO: 0.2 MG/DL (ref 0–2)
WBC # BLD AUTO: 11.2 10^3/UL (ref 4–10)
WBC #/AREA URNS AUTO: 4 /HPF (ref 0–3)

## 2022-02-12 PROCEDURE — 99284 EMERGENCY DEPT VISIT MOD MDM: CPT

## 2022-02-12 PROCEDURE — 72131 CT LUMBAR SPINE W/O DYE: CPT

## 2022-02-12 PROCEDURE — 81001 URINALYSIS AUTO W/SCOPE: CPT

## 2022-02-12 PROCEDURE — 96361 HYDRATE IV INFUSION ADD-ON: CPT

## 2022-02-12 PROCEDURE — 96374 THER/PROPH/DIAG INJ IV PUSH: CPT

## 2022-02-12 PROCEDURE — 80048 BASIC METABOLIC PNL TOTAL CA: CPT

## 2022-02-12 PROCEDURE — 85025 COMPLETE CBC W/AUTO DIFF WBC: CPT

## 2022-02-12 PROCEDURE — 74176 CT ABD & PELVIS W/O CONTRAST: CPT

## 2022-02-12 PROCEDURE — 96372 THER/PROPH/DIAG INJ SC/IM: CPT

## 2022-03-01 ENCOUNTER — HOSPITAL ENCOUNTER (OUTPATIENT)
Dept: HOSPITAL 53 - M LAB | Age: 67
End: 2022-03-01
Attending: FAMILY MEDICINE
Payer: COMMERCIAL

## 2022-03-01 DIAGNOSIS — I10: Primary | ICD-10-CM

## 2022-03-01 LAB
25(OH)D3 SERPL-MCNC: 24.5 NG/ML (ref 30–100)
ALBUMIN SERPL BCG-MCNC: 3.5 GM/DL (ref 3.2–5.2)
ALT SERPL W P-5'-P-CCNC: 27 U/L (ref 12–78)
BASOPHILS # BLD AUTO: 0.1 10^3/UL (ref 0–0.2)
BASOPHILS NFR BLD AUTO: 0.7 % (ref 0–1)
BILIRUB CONJ SERPL-MCNC: < 0.1 MG/DL (ref 0–0.2)
BILIRUB SERPL-MCNC: 0.3 MG/DL (ref 0.2–1)
BUN SERPL-MCNC: 11 MG/DL (ref 7–18)
CALCIUM SERPL-MCNC: 9.5 MG/DL (ref 8.8–10.2)
CHLORIDE SERPL-SCNC: 103 MEQ/L (ref 98–107)
CO2 SERPL-SCNC: 29 MEQ/L (ref 21–32)
CREAT SERPL-MCNC: 0.91 MG/DL (ref 0.55–1.3)
EOSINOPHIL # BLD AUTO: 0.1 10^3/UL (ref 0–0.5)
EOSINOPHIL NFR BLD AUTO: 1.3 % (ref 0–3)
GFR SERPL CREATININE-BSD FRML MDRD: > 60 ML/MIN/{1.73_M2} (ref 45–?)
GLUCOSE SERPL-MCNC: 123 MG/DL (ref 70–100)
HCT VFR BLD AUTO: 35.8 % (ref 36–47)
HGB BLD-MCNC: 11.7 G/DL (ref 12–15.5)
LYMPHOCYTES # BLD AUTO: 3.5 10^3/UL (ref 1.5–5)
LYMPHOCYTES NFR BLD AUTO: 36.2 % (ref 24–44)
MCH RBC QN AUTO: 30.2 PG (ref 27–33)
MCHC RBC AUTO-ENTMCNC: 32.7 G/DL (ref 32–36.5)
MCV RBC AUTO: 92.5 FL (ref 80–96)
MONOCYTES # BLD AUTO: 0.6 10^3/UL (ref 0–0.8)
MONOCYTES NFR BLD AUTO: 6.6 % (ref 2–8)
NEUTROPHILS # BLD AUTO: 5.3 10^3/UL (ref 1.5–8.5)
NEUTROPHILS NFR BLD AUTO: 55 % (ref 36–66)
PLATELET # BLD AUTO: 389 10^3/UL (ref 150–450)
POTASSIUM SERPL-SCNC: 4.2 MEQ/L (ref 3.5–5.1)
PROT SERPL-MCNC: 6.8 GM/DL (ref 6.4–8.2)
RBC # BLD AUTO: 3.87 10^6/UL (ref 4–5.4)
SODIUM SERPL-SCNC: 137 MEQ/L (ref 136–145)
WBC # BLD AUTO: 9.6 10^3/UL (ref 4–10)

## 2022-03-24 ENCOUNTER — HOSPITAL ENCOUNTER (OUTPATIENT)
Dept: HOSPITAL 53 - M RAD | Age: 67
End: 2022-03-24
Attending: FAMILY MEDICINE
Payer: COMMERCIAL

## 2022-03-24 DIAGNOSIS — J84.9: ICD-10-CM

## 2022-03-24 DIAGNOSIS — Z12.2: Primary | ICD-10-CM

## 2022-03-24 DIAGNOSIS — F17.210: ICD-10-CM

## 2022-03-24 DIAGNOSIS — J47.9: ICD-10-CM

## 2022-03-24 DIAGNOSIS — R91.8: ICD-10-CM

## 2022-05-09 ENCOUNTER — HOSPITAL ENCOUNTER (OUTPATIENT)
Dept: HOSPITAL 53 - M LABSMTC | Age: 67
End: 2022-05-09
Attending: ANESTHESIOLOGY
Payer: COMMERCIAL

## 2022-05-09 DIAGNOSIS — Z20.822: ICD-10-CM

## 2022-05-09 DIAGNOSIS — Z01.812: Primary | ICD-10-CM

## 2022-05-12 ENCOUNTER — HOSPITAL ENCOUNTER (OUTPATIENT)
Dept: HOSPITAL 53 - M PLAIMG | Age: 67
End: 2022-05-12
Attending: PHYSICIAN ASSISTANT
Payer: COMMERCIAL

## 2022-05-12 DIAGNOSIS — M51.16: Primary | ICD-10-CM

## 2022-05-13 ENCOUNTER — HOSPITAL ENCOUNTER (OUTPATIENT)
Dept: HOSPITAL 53 - M OPP | Age: 67
Discharge: HOME | End: 2022-05-13
Attending: INTERNAL MEDICINE
Payer: COMMERCIAL

## 2022-05-13 VITALS — SYSTOLIC BLOOD PRESSURE: 105 MMHG | DIASTOLIC BLOOD PRESSURE: 55 MMHG

## 2022-05-13 VITALS — HEIGHT: 60 IN | BODY MASS INDEX: 26.9 KG/M2 | WEIGHT: 137 LBS

## 2022-05-13 DIAGNOSIS — R12: ICD-10-CM

## 2022-05-13 DIAGNOSIS — K63.5: Primary | ICD-10-CM

## 2022-05-13 DIAGNOSIS — Q43.8: ICD-10-CM

## 2022-05-13 DIAGNOSIS — Z88.8: ICD-10-CM

## 2022-05-13 DIAGNOSIS — Z79.02: ICD-10-CM

## 2022-05-13 DIAGNOSIS — Z87.19: ICD-10-CM

## 2022-05-13 DIAGNOSIS — Z79.899: ICD-10-CM

## 2022-05-13 DIAGNOSIS — Z86.010: ICD-10-CM

## 2022-05-13 PROCEDURE — 45385 COLONOSCOPY W/LESION REMOVAL: CPT

## 2022-05-13 PROCEDURE — 88305 TISSUE EXAM BY PATHOLOGIST: CPT

## 2022-05-13 PROCEDURE — 45380 COLONOSCOPY AND BIOPSY: CPT

## 2022-05-13 PROCEDURE — 43235 EGD DIAGNOSTIC BRUSH WASH: CPT

## 2022-06-03 ENCOUNTER — HOSPITAL ENCOUNTER (OUTPATIENT)
Dept: HOSPITAL 53 - M WHC | Age: 67
End: 2022-06-03
Attending: FAMILY MEDICINE
Payer: COMMERCIAL

## 2022-06-03 ENCOUNTER — HOSPITAL ENCOUNTER (OUTPATIENT)
Dept: HOSPITAL 53 - M LAB | Age: 67
End: 2022-06-03
Attending: FAMILY MEDICINE
Payer: COMMERCIAL

## 2022-06-03 DIAGNOSIS — Z13.820: ICD-10-CM

## 2022-06-03 DIAGNOSIS — R73.03: Primary | ICD-10-CM

## 2022-06-03 DIAGNOSIS — M85.852: ICD-10-CM

## 2022-06-03 DIAGNOSIS — M85.851: ICD-10-CM

## 2022-06-03 DIAGNOSIS — R73.03: ICD-10-CM

## 2022-06-03 DIAGNOSIS — Z12.31: Primary | ICD-10-CM

## 2022-06-03 LAB — EST. AVERAGE GLUCOSE BLD GHB EST-MCNC: 123 MG/DL (ref 60–110)

## 2022-07-05 ENCOUNTER — HOSPITAL ENCOUNTER (OUTPATIENT)
Dept: HOSPITAL 53 - M LAB | Age: 67
End: 2022-07-05
Attending: PHYSICAL MEDICINE & REHABILITATION
Payer: COMMERCIAL

## 2022-07-05 DIAGNOSIS — M48.061: ICD-10-CM

## 2022-07-05 DIAGNOSIS — M51.16: Primary | ICD-10-CM

## 2022-07-05 LAB
APTT BLD: 27.9 SECONDS (ref 25.9–37)
INR PPP: 0.9
PLATELET # BLD AUTO: 341 10^3/UL (ref 150–450)
PROTHROMBIN TIME: 12.5 SECONDS (ref 12.7–14.5)

## 2022-07-26 ENCOUNTER — HOSPITAL ENCOUNTER (OUTPATIENT)
Dept: HOSPITAL 53 - M LAB | Age: 67
End: 2022-07-26
Attending: PHYSICIAN ASSISTANT
Payer: COMMERCIAL

## 2022-07-26 DIAGNOSIS — M48.061: ICD-10-CM

## 2022-07-26 DIAGNOSIS — M51.16: Primary | ICD-10-CM

## 2022-07-26 DIAGNOSIS — S39.012D: ICD-10-CM

## 2022-07-26 LAB
BUN SERPL-MCNC: 13 MG/DL (ref 7–18)
CREAT SERPL-MCNC: 1.03 MG/DL (ref 0.55–1.3)
GFR SERPL CREATININE-BSD FRML MDRD: 57.3 ML/MIN/{1.73_M2} (ref 45–?)

## 2022-07-29 ENCOUNTER — HOSPITAL ENCOUNTER (OUTPATIENT)
Dept: HOSPITAL 53 - M PLARAD | Age: 67
End: 2022-07-29
Attending: PHYSICIAN ASSISTANT
Payer: COMMERCIAL

## 2022-07-29 DIAGNOSIS — M51.16: Primary | ICD-10-CM

## 2022-07-29 DIAGNOSIS — M47.816: ICD-10-CM

## 2022-07-29 DIAGNOSIS — M25.78: ICD-10-CM

## 2022-09-06 ENCOUNTER — HOSPITAL ENCOUNTER (OUTPATIENT)
Dept: HOSPITAL 53 - M LAB | Age: 67
End: 2022-09-06
Payer: COMMERCIAL

## 2022-09-06 DIAGNOSIS — R73.03: Primary | ICD-10-CM

## 2022-09-06 LAB — EST. AVERAGE GLUCOSE BLD GHB EST-MCNC: 120 MG/DL (ref 60–110)

## 2022-09-14 ENCOUNTER — HOSPITAL ENCOUNTER (OUTPATIENT)
Dept: HOSPITAL 53 - M PLALAB | Age: 67
End: 2022-09-14
Attending: ORTHOPAEDIC SURGERY
Payer: COMMERCIAL

## 2022-09-14 DIAGNOSIS — M50.30: Primary | ICD-10-CM

## 2022-09-14 LAB
BASOPHILS # BLD AUTO: 0.1 10^3/UL (ref 0–0.2)
BASOPHILS NFR BLD AUTO: 0.8 % (ref 0–1)
CRP SERPL-MCNC: < 0.3 MG/DL (ref 0–0.3)
EOSINOPHIL # BLD AUTO: 0.2 10^3/UL (ref 0–0.5)
EOSINOPHIL NFR BLD AUTO: 1.4 % (ref 0–3)
ERYTHROCYTE [SEDIMENTATION RATE] IN BLOOD BY WESTERGREN METHOD: 28 MM/HR (ref 0–30)
HCT VFR BLD AUTO: 38.6 % (ref 36–47)
HGB BLD-MCNC: 12.8 G/DL (ref 12–15.5)
LYMPHOCYTES # BLD AUTO: 4 10^3/UL (ref 1.5–5)
LYMPHOCYTES NFR BLD AUTO: 32.9 % (ref 24–44)
MCH RBC QN AUTO: 30.3 PG (ref 27–33)
MCHC RBC AUTO-ENTMCNC: 33.2 G/DL (ref 32–36.5)
MCV RBC AUTO: 91.5 FL (ref 80–96)
MONOCYTES # BLD AUTO: 1 10^3/UL (ref 0–0.8)
MONOCYTES NFR BLD AUTO: 8.1 % (ref 2–8)
NEUTROPHILS # BLD AUTO: 6.9 10^3/UL (ref 1.5–8.5)
NEUTROPHILS NFR BLD AUTO: 56.5 % (ref 36–66)
PLATELET # BLD AUTO: 357 10^3/UL (ref 150–450)
PROT SERPL-MCNC: 7.2 GM/DL (ref 6.4–8.2)
RBC # BLD AUTO: 4.22 10^6/UL (ref 4–5.4)
RHEUMATOID FACT SERPL-ACNC: < 10 IU/ML (ref ?–15)
WBC # BLD AUTO: 12.2 10^3/UL (ref 4–10)

## 2022-11-14 ENCOUNTER — HOSPITAL ENCOUNTER (OUTPATIENT)
Dept: HOSPITAL 53 - M LAB | Age: 67
End: 2022-11-14
Attending: INTERNAL MEDICINE
Payer: COMMERCIAL

## 2022-11-14 DIAGNOSIS — R19.7: Primary | ICD-10-CM

## 2023-03-13 ENCOUNTER — HOSPITAL ENCOUNTER (OUTPATIENT)
Dept: HOSPITAL 53 - M LAB | Age: 68
End: 2023-03-13
Payer: COMMERCIAL

## 2023-03-13 DIAGNOSIS — I10: Primary | ICD-10-CM

## 2023-03-13 LAB
ALBUMIN SERPL BCG-MCNC: 3.8 G/DL (ref 3.2–5.2)
ALP SERPL-CCNC: 103 U/L (ref 46–116)
ALT SERPL W P-5'-P-CCNC: 21 U/L (ref 7–40)
AST SERPL-CCNC: 20 U/L (ref ?–34)
BASOPHILS # BLD AUTO: 0.1 10^3/UL (ref 0–0.2)
BASOPHILS NFR BLD AUTO: 1 % (ref 0–1)
BILIRUB SERPL-MCNC: 0.5 MG/DL (ref 0.3–1.2)
BUN SERPL-MCNC: 13 MG/DL (ref 9–23)
CALCIUM SERPL-MCNC: 9.6 MG/DL (ref 8.3–10.6)
CHLORIDE SERPL-SCNC: 101 MMOL/L (ref 98–107)
CHOLEST SERPL-MCNC: 234 MG/DL (ref ?–200)
CHOLEST/HDLC SERPL: 6.03 {RATIO} (ref ?–5)
CO2 SERPL-SCNC: 28 MMOL/L (ref 20–31)
CREAT SERPL-MCNC: 0.91 MG/DL (ref 0.55–1.3)
EOSINOPHIL # BLD AUTO: 0.3 10^3/UL (ref 0–0.5)
EOSINOPHIL NFR BLD AUTO: 2.8 % (ref 0–3)
EST. AVERAGE GLUCOSE BLD GHB EST-MCNC: 117 MG/DL (ref 60–110)
GFR SERPL CREATININE-BSD FRML MDRD: > 60 ML/MIN/{1.73_M2} (ref 45–?)
GLUCOSE SERPL-MCNC: 97 MG/DL (ref 74–106)
HCT VFR BLD AUTO: 39.1 % (ref 36–47)
HDLC SERPL-MCNC: 38.8 MG/DL (ref 40–?)
HGB BLD-MCNC: 13.3 G/DL (ref 12–15.5)
LDLC SERPL CALC-MCNC: 115.4 MG/DL (ref ?–100)
LYMPHOCYTES # BLD AUTO: 4 10^3/UL (ref 1.5–5)
LYMPHOCYTES NFR BLD AUTO: 42.5 % (ref 24–44)
MCH RBC QN AUTO: 31.5 PG (ref 27–33)
MCHC RBC AUTO-ENTMCNC: 34 G/DL (ref 32–36.5)
MCV RBC AUTO: 92.7 FL (ref 80–96)
MONOCYTES # BLD AUTO: 0.7 10^3/UL (ref 0–0.8)
MONOCYTES NFR BLD AUTO: 7.2 % (ref 2–8)
NEUTROPHILS # BLD AUTO: 4.4 10^3/UL (ref 1.5–8.5)
NEUTROPHILS NFR BLD AUTO: 46.3 % (ref 36–66)
NONHDLC SERPL-MCNC: 195.2 MG/DL
PLATELET # BLD AUTO: 321 10^3/UL (ref 150–450)
POTASSIUM SERPL-SCNC: 4.5 MMOL/L (ref 3.5–5.1)
PROT SERPL-MCNC: 7.2 G/DL (ref 5.7–8.2)
RBC # BLD AUTO: 4.22 10^6/UL (ref 4–5.4)
SODIUM SERPL-SCNC: 136 MMOL/L (ref 136–145)
TRIGL SERPL-MCNC: 399 MG/DL (ref ?–150)
WBC # BLD AUTO: 9.4 10^3/UL (ref 4–10)

## 2023-04-25 ENCOUNTER — HOSPITAL ENCOUNTER (OUTPATIENT)
Dept: HOSPITAL 53 - M RAD | Age: 68
End: 2023-04-25
Attending: FAMILY MEDICINE
Payer: COMMERCIAL

## 2023-04-25 DIAGNOSIS — F17.210: Primary | ICD-10-CM

## 2023-10-27 ENCOUNTER — HOSPITAL ENCOUNTER (OUTPATIENT)
Dept: HOSPITAL 53 - M LAB | Age: 68
End: 2023-10-27
Payer: COMMERCIAL

## 2023-10-27 DIAGNOSIS — E78.2: Primary | ICD-10-CM

## 2023-10-27 LAB
CHOLEST SERPL-MCNC: 217 MG/DL (ref ?–200)
CHOLEST/HDLC SERPL: 6.27 {RATIO} (ref ?–5)
HDLC SERPL-MCNC: 34.6 MG/DL (ref 40–?)
NONHDLC SERPL-MCNC: 182.4 MG/DL
TRIGL SERPL-MCNC: 446 MG/DL (ref ?–150)

## 2023-11-14 ENCOUNTER — HOSPITAL ENCOUNTER (OUTPATIENT)
Dept: HOSPITAL 53 - M WHC | Age: 68
End: 2023-11-14
Payer: COMMERCIAL

## 2023-11-14 DIAGNOSIS — Z12.31: Primary | ICD-10-CM

## 2023-11-21 ENCOUNTER — HOSPITAL ENCOUNTER (OUTPATIENT)
Dept: HOSPITAL 53 - M OPP | Age: 68
Discharge: HOME | End: 2023-11-21
Attending: INTERNAL MEDICINE
Payer: COMMERCIAL

## 2023-11-21 VITALS — DIASTOLIC BLOOD PRESSURE: 57 MMHG | SYSTOLIC BLOOD PRESSURE: 133 MMHG | OXYGEN SATURATION: 97 %

## 2023-11-21 VITALS — HEIGHT: 60 IN | WEIGHT: 124 LBS | BODY MASS INDEX: 24.35 KG/M2

## 2023-11-21 VITALS — TEMPERATURE: 97 F

## 2023-11-21 DIAGNOSIS — E78.5: ICD-10-CM

## 2023-11-21 DIAGNOSIS — Z88.8: ICD-10-CM

## 2023-11-21 DIAGNOSIS — F17.210: ICD-10-CM

## 2023-11-21 DIAGNOSIS — I10: ICD-10-CM

## 2023-11-21 DIAGNOSIS — F41.8: ICD-10-CM

## 2023-11-21 DIAGNOSIS — Z79.899: ICD-10-CM

## 2023-11-21 DIAGNOSIS — K52.839: ICD-10-CM

## 2023-11-21 DIAGNOSIS — D12.5: Primary | ICD-10-CM

## 2023-11-21 DIAGNOSIS — Z80.0: ICD-10-CM

## 2023-11-21 DIAGNOSIS — R19.7: ICD-10-CM

## 2024-04-25 ENCOUNTER — HOSPITAL ENCOUNTER (OUTPATIENT)
Dept: HOSPITAL 53 - M RAD | Age: 69
End: 2024-04-25
Payer: COMMERCIAL

## 2024-04-25 DIAGNOSIS — M85.89: ICD-10-CM

## 2024-04-25 DIAGNOSIS — E78.2: ICD-10-CM

## 2024-04-25 DIAGNOSIS — R73.03: ICD-10-CM

## 2024-04-25 DIAGNOSIS — I70.0: ICD-10-CM

## 2024-04-25 DIAGNOSIS — F17.210: ICD-10-CM

## 2024-04-25 DIAGNOSIS — J47.9: ICD-10-CM

## 2024-04-25 DIAGNOSIS — I10: ICD-10-CM

## 2024-04-25 DIAGNOSIS — Z12.2: Primary | ICD-10-CM

## 2024-04-25 LAB
ALBUMIN SERPL BCG-MCNC: 3.4 G/DL (ref 3.2–5.2)
ALP SERPL-CCNC: 137 U/L (ref 46–116)
ALT SERPL W P-5'-P-CCNC: 12 U/L (ref 7–40)
AST SERPL-CCNC: 17 U/L (ref ?–34)
BASOPHILS # BLD AUTO: 0.1 10^3/UL (ref 0–0.2)
BASOPHILS NFR BLD AUTO: 0.9 % (ref 0–1)
BILIRUB SERPL-MCNC: 0.4 MG/DL (ref 0.3–1.2)
BUN SERPL-MCNC: 15 MG/DL (ref 9–23)
CALCIUM SERPL-MCNC: 9.4 MG/DL (ref 8.3–10.6)
CHLORIDE SERPL-SCNC: 104 MMOL/L (ref 98–107)
CHOLEST SERPL-MCNC: 181 MG/DL (ref ?–200)
CHOLEST/HDLC SERPL: 5.4 {RATIO} (ref ?–5)
CO2 SERPL-SCNC: 30 MMOL/L (ref 20–31)
CREAT SERPL-MCNC: 0.81 MG/DL (ref 0.55–1.3)
EOSINOPHIL # BLD AUTO: 0.2 10^3/UL (ref 0–0.5)
EOSINOPHIL NFR BLD AUTO: 2.8 % (ref 0–3)
EST. AVERAGE GLUCOSE BLD GHB EST-MCNC: 103 MG/DL (ref 60–110)
GFR SERPL CREATININE-BSD FRML MDRD: > 60 ML/MIN/{1.73_M2} (ref 45–?)
GLUCOSE SERPL-MCNC: 94 MG/DL (ref 74–106)
HCT VFR BLD AUTO: 37.4 % (ref 36–47)
HDLC SERPL-MCNC: 33.5 MG/DL (ref 40–?)
HGB BLD-MCNC: 12.6 G/DL (ref 12–15.5)
LDLC SERPL CALC-MCNC: 100.5 MG/DL (ref ?–100)
LYMPHOCYTES # BLD AUTO: 3.1 10^3/UL (ref 1.5–5)
LYMPHOCYTES NFR BLD AUTO: 37 % (ref 24–44)
MCH RBC QN AUTO: 31 PG (ref 27–33)
MCHC RBC AUTO-ENTMCNC: 33.7 G/DL (ref 32–36.5)
MCV RBC AUTO: 91.9 FL (ref 80–96)
MONOCYTES # BLD AUTO: 0.6 10^3/UL (ref 0–0.8)
MONOCYTES NFR BLD AUTO: 7.5 % (ref 2–8)
NEUTROPHILS # BLD AUTO: 4.4 10^3/UL (ref 1.5–8.5)
NEUTROPHILS NFR BLD AUTO: 51.6 % (ref 36–66)
NONHDLC SERPL-MCNC: 147.5 MG/DL
PLATELET # BLD AUTO: 396 10^3/UL (ref 150–450)
POTASSIUM SERPL-SCNC: 4.6 MMOL/L (ref 3.5–5.1)
PROT SERPL-MCNC: 6.8 G/DL (ref 5.7–8.2)
RBC # BLD AUTO: 4.07 10^6/UL (ref 4–5.4)
SODIUM SERPL-SCNC: 139 MMOL/L (ref 136–145)
TRIGL SERPL-MCNC: 235 MG/DL (ref ?–150)
WBC # BLD AUTO: 8.5 10^3/UL (ref 4–10)

## 2024-04-29 ENCOUNTER — HOSPITAL ENCOUNTER (OUTPATIENT)
Dept: HOSPITAL 53 - M PAIN | Age: 69
End: 2024-04-29
Attending: FAMILY MEDICINE
Payer: COMMERCIAL

## 2024-04-29 DIAGNOSIS — Z79.899: ICD-10-CM

## 2024-04-29 DIAGNOSIS — Z88.8: ICD-10-CM

## 2024-04-29 DIAGNOSIS — G89.29: ICD-10-CM

## 2024-04-29 DIAGNOSIS — M51.16: Primary | ICD-10-CM

## 2024-05-13 ENCOUNTER — HOSPITAL ENCOUNTER (OUTPATIENT)
Dept: HOSPITAL 53 - M PLAIMG | Age: 69
End: 2024-05-13
Attending: FAMILY MEDICINE
Payer: COMMERCIAL

## 2024-05-13 DIAGNOSIS — M51.46: ICD-10-CM

## 2024-05-13 DIAGNOSIS — M51.16: Primary | ICD-10-CM

## 2024-05-21 ENCOUNTER — HOSPITAL ENCOUNTER (OUTPATIENT)
Dept: HOSPITAL 53 - M PAIN | Age: 69
End: 2024-05-21
Attending: FAMILY MEDICINE
Payer: COMMERCIAL

## 2024-05-21 DIAGNOSIS — F17.200: ICD-10-CM

## 2024-05-21 DIAGNOSIS — G89.29: ICD-10-CM

## 2024-05-21 DIAGNOSIS — M79.18: Primary | ICD-10-CM

## 2024-05-21 DIAGNOSIS — Z79.899: ICD-10-CM

## 2024-05-21 DIAGNOSIS — Z88.8: ICD-10-CM

## 2024-06-04 ENCOUNTER — HOSPITAL ENCOUNTER (OUTPATIENT)
Dept: HOSPITAL 53 - M WHC | Age: 69
End: 2024-06-04
Payer: COMMERCIAL

## 2024-06-04 DIAGNOSIS — M85.89: ICD-10-CM

## 2024-06-04 DIAGNOSIS — Z13.820: Primary | ICD-10-CM

## 2024-08-20 ENCOUNTER — HOSPITAL ENCOUNTER (OUTPATIENT)
Dept: HOSPITAL 53 - M PAIN | Age: 69
End: 2024-08-20
Attending: ANESTHESIOLOGY
Payer: COMMERCIAL

## 2024-08-20 DIAGNOSIS — Z79.899: ICD-10-CM

## 2024-08-20 DIAGNOSIS — F17.200: ICD-10-CM

## 2024-08-20 DIAGNOSIS — G89.29: ICD-10-CM

## 2024-08-20 DIAGNOSIS — M79.18: Primary | ICD-10-CM

## 2024-08-20 PROCEDURE — 20552 NJX 1/MLT TRIGGER POINT 1/2: CPT

## 2024-09-20 ENCOUNTER — HOSPITAL ENCOUNTER (OUTPATIENT)
Dept: HOSPITAL 53 - M PAIN | Age: 69
End: 2024-09-20
Attending: FAMILY MEDICINE
Payer: COMMERCIAL

## 2024-09-20 DIAGNOSIS — F41.9: ICD-10-CM

## 2024-09-20 DIAGNOSIS — G89.29: Primary | ICD-10-CM

## 2024-09-20 DIAGNOSIS — M54.50: ICD-10-CM

## 2024-09-20 DIAGNOSIS — I10: ICD-10-CM

## 2024-09-20 DIAGNOSIS — F17.200: ICD-10-CM

## 2024-09-20 DIAGNOSIS — M79.10: ICD-10-CM

## 2024-09-20 DIAGNOSIS — M46.00: ICD-10-CM

## 2024-09-20 DIAGNOSIS — Z79.899: ICD-10-CM

## 2024-10-17 ENCOUNTER — HOSPITAL ENCOUNTER (OUTPATIENT)
Dept: HOSPITAL 53 - M PAIN | Age: 69
End: 2024-10-17
Attending: ANESTHESIOLOGY
Payer: COMMERCIAL

## 2024-10-17 DIAGNOSIS — F41.9: ICD-10-CM

## 2024-10-17 DIAGNOSIS — M46.06: Primary | ICD-10-CM

## 2024-10-17 DIAGNOSIS — G89.29: ICD-10-CM

## 2024-10-17 DIAGNOSIS — Z88.8: ICD-10-CM

## 2024-10-17 DIAGNOSIS — I10: ICD-10-CM

## 2024-10-17 DIAGNOSIS — Z79.899: ICD-10-CM

## 2024-10-17 DIAGNOSIS — F17.200: ICD-10-CM

## 2024-10-17 DIAGNOSIS — M54.50: ICD-10-CM

## 2024-10-17 PROCEDURE — 20550 NJX 1 TENDON SHEATH/LIGAMENT: CPT

## 2024-10-17 PROCEDURE — 77002 NEEDLE LOCALIZATION BY XRAY: CPT

## 2024-11-22 ENCOUNTER — HOSPITAL ENCOUNTER (OUTPATIENT)
Dept: HOSPITAL 53 - M PAIN | Age: 69
End: 2024-11-22
Attending: FAMILY MEDICINE
Payer: COMMERCIAL

## 2024-11-22 DIAGNOSIS — G89.29: ICD-10-CM

## 2024-11-22 DIAGNOSIS — Z79.899: ICD-10-CM

## 2024-11-22 DIAGNOSIS — M54.50: ICD-10-CM

## 2024-11-22 DIAGNOSIS — M46.06: Primary | ICD-10-CM

## 2024-11-22 DIAGNOSIS — F41.9: ICD-10-CM

## 2024-11-22 DIAGNOSIS — I10: ICD-10-CM

## 2024-11-22 DIAGNOSIS — F17.200: ICD-10-CM

## 2024-11-22 DIAGNOSIS — Z88.8: ICD-10-CM

## 2024-12-05 ENCOUNTER — HOSPITAL ENCOUNTER (OUTPATIENT)
Dept: HOSPITAL 53 - M WHC | Age: 69
End: 2024-12-05
Attending: FAMILY MEDICINE
Payer: COMMERCIAL

## 2024-12-05 DIAGNOSIS — R92.323: ICD-10-CM

## 2024-12-05 DIAGNOSIS — Z12.31: Primary | ICD-10-CM

## 2025-01-23 ENCOUNTER — HOSPITAL ENCOUNTER (OUTPATIENT)
Dept: HOSPITAL 53 - M PAIN | Age: 70
End: 2025-01-23
Attending: FAMILY MEDICINE
Payer: COMMERCIAL

## 2025-01-23 DIAGNOSIS — Z88.8: ICD-10-CM

## 2025-01-23 DIAGNOSIS — Z79.899: ICD-10-CM

## 2025-01-23 DIAGNOSIS — I10: ICD-10-CM

## 2025-01-23 DIAGNOSIS — M46.06: Primary | ICD-10-CM

## 2025-03-24 ENCOUNTER — HOSPITAL ENCOUNTER (OUTPATIENT)
Dept: HOSPITAL 53 - M LAB | Age: 70
End: 2025-03-24
Attending: FAMILY MEDICINE
Payer: COMMERCIAL

## 2025-03-24 DIAGNOSIS — I10: Primary | ICD-10-CM

## 2025-03-24 LAB
ALBUMIN SERPL BCG-MCNC: 3.6 G/DL (ref 3.2–5.2)
ALP SERPL-CCNC: 84 U/L (ref 35–104)
ALT SERPL W P-5'-P-CCNC: 22 U/L (ref 7–40)
AST SERPL-CCNC: 18 U/L (ref ?–34)
BASOPHILS # BLD AUTO: 0.1 10^3/UL (ref 0–0.2)
BASOPHILS NFR BLD AUTO: 1.3 % (ref 0–1)
BILIRUB SERPL-MCNC: 0.4 MG/DL (ref 0.3–1.2)
BUN SERPL-MCNC: 10 MG/DL (ref 9–23)
CALCIUM SERPL-MCNC: 9.4 MG/DL (ref 8.3–10.6)
CHLORIDE SERPL-SCNC: 104 MMOL/L (ref 98–107)
CHOLEST SERPL-MCNC: 200 MG/DL (ref ?–200)
CHOLEST/HDLC SERPL: 5.64 {RATIO} (ref ?–5)
CO2 SERPL-SCNC: 28 MMOL/L (ref 20–31)
CREAT SERPL-MCNC: 0.83 MG/DL (ref 0.55–1.3)
EOSINOPHIL # BLD AUTO: 0.2 10^3/UL (ref 0–0.5)
EOSINOPHIL NFR BLD AUTO: 2.2 % (ref 0–3)
GFR SERPL CREATININE-BSD FRML MDRD: > 60 ML/MIN/{1.73_M2} (ref 45–?)
GLUCOSE SERPL-MCNC: 93 MG/DL (ref 74–106)
HCT VFR BLD AUTO: 38.5 % (ref 36–47)
HDLC SERPL-MCNC: 35.4 MG/DL (ref 40–?)
HGB BLD-MCNC: 13 G/DL (ref 12–15.5)
LDLC SERPL CALC-MCNC: 102.4 MG/DL (ref ?–100)
LYMPHOCYTES # BLD AUTO: 4.3 10^3/UL (ref 1.5–5)
LYMPHOCYTES NFR BLD AUTO: 46.5 % (ref 24–44)
MCH RBC QN AUTO: 31.3 PG (ref 27–33)
MCHC RBC AUTO-ENTMCNC: 33.8 G/DL (ref 32–36.5)
MCV RBC AUTO: 92.8 FL (ref 80–96)
MONOCYTES # BLD AUTO: 0.6 10^3/UL (ref 0–0.8)
MONOCYTES NFR BLD AUTO: 6.5 % (ref 2–8)
NEUTROPHILS # BLD AUTO: 4 10^3/UL (ref 1.5–8.5)
NEUTROPHILS NFR BLD AUTO: 43.3 % (ref 36–66)
NONHDLC SERPL-MCNC: 164.6 MG/DL
PLATELET # BLD AUTO: 358 10^3/UL (ref 150–450)
POTASSIUM SERPL-SCNC: 4.3 MMOL/L (ref 3.5–5.1)
PROT SERPL-MCNC: 7 G/DL (ref 5.7–8.2)
RBC # BLD AUTO: 4.15 10^6/UL (ref 4–5.4)
SODIUM SERPL-SCNC: 137 MMOL/L (ref 136–145)
TRIGL SERPL-MCNC: 311 MG/DL (ref ?–150)
WBC # BLD AUTO: 9.2 10^3/UL (ref 4–10)

## 2025-05-22 ENCOUNTER — HOSPITAL ENCOUNTER (OUTPATIENT)
Dept: HOSPITAL 53 - M RAD | Age: 70
End: 2025-05-22
Payer: COMMERCIAL

## 2025-05-22 DIAGNOSIS — J98.4: ICD-10-CM

## 2025-05-22 DIAGNOSIS — F17.210: ICD-10-CM

## 2025-05-22 DIAGNOSIS — J43.9: Primary | ICD-10-CM

## 2025-05-22 DIAGNOSIS — J47.9: ICD-10-CM
